# Patient Record
Sex: FEMALE | Race: BLACK OR AFRICAN AMERICAN | Employment: FULL TIME | URBAN - METROPOLITAN AREA
[De-identification: names, ages, dates, MRNs, and addresses within clinical notes are randomized per-mention and may not be internally consistent; named-entity substitution may affect disease eponyms.]

---

## 2024-03-12 PROBLEM — R91.1 LESION OF RIGHT LUNG: Status: ACTIVE | Noted: 2024-03-12

## 2024-03-12 PROBLEM — L98.9 LEG LESION: Status: ACTIVE | Noted: 2024-03-12

## 2024-03-12 PROBLEM — Z72.0 TOBACCO USE: Status: ACTIVE | Noted: 2024-03-12

## 2024-03-12 PROBLEM — Z12.31 BREAST CANCER SCREENING BY MAMMOGRAM: Status: ACTIVE | Noted: 2024-03-12

## 2024-03-25 ENCOUNTER — OFFICE VISIT (OUTPATIENT)
Dept: PULMONOLOGY | Age: 54
End: 2024-03-25
Payer: COMMERCIAL

## 2024-03-25 VITALS
HEIGHT: 62 IN | DIASTOLIC BLOOD PRESSURE: 68 MMHG | WEIGHT: 104 LBS | HEART RATE: 68 BPM | SYSTOLIC BLOOD PRESSURE: 140 MMHG | OXYGEN SATURATION: 98 % | BODY MASS INDEX: 19.14 KG/M2

## 2024-03-25 DIAGNOSIS — E78.49 OTHER HYPERLIPIDEMIA: ICD-10-CM

## 2024-03-25 DIAGNOSIS — R91.1 PULMONARY NODULE: Primary | ICD-10-CM

## 2024-03-25 DIAGNOSIS — I10 PRIMARY HYPERTENSION: ICD-10-CM

## 2024-03-25 PROBLEM — E78.5 HYPERLIPIDEMIA: Status: ACTIVE | Noted: 2024-03-25

## 2024-03-25 PROCEDURE — 3077F SYST BP >= 140 MM HG: CPT | Performed by: INTERNAL MEDICINE

## 2024-03-25 PROCEDURE — 3078F DIAST BP <80 MM HG: CPT | Performed by: INTERNAL MEDICINE

## 2024-03-25 PROCEDURE — 99204 OFFICE O/P NEW MOD 45 MIN: CPT | Performed by: INTERNAL MEDICINE

## 2024-03-25 NOTE — PROGRESS NOTES
[facility] Pulmonary and Critical Care    Outpatient Initial Note    Subjective:   CHIEF COMPLAINT / HPI:     The patient is 53 y.o. female who presents today for a new patient visit for abnormal lung cancer screening CT chest.  She has approximately 40-pack-year history of tobacco.  She complains of some dyspnea on exertion however it is partially improved with Breztri although her compliance is spotty.  She has night sweats that she attributes to menopause but no fevers, chills, hemoptysis, anorexia, or weight loss.    Past Medical History:    Past Medical History:   Diagnosis Date    HTN (hypertension)     Hyperlipidemia        Social History:      Currently smokes 3/4 pack of cigarettes a day. Smoked up to 1 PPD since 1983  Works at [employer].    Family History:  Asthma  Colon cancer  - Dad  Prostate cancer  Lung cancer - Aunt - smoker    Current Medications:  Current Outpatient Medications on File Prior to Visit   Medication Sig Dispense Refill    esomeprazole (NEXIUM) 20 MG delayed release capsule Take 1 capsule by mouth daily as needed      atorvastatin (LIPITOR) 10 MG tablet Take 1 tablet by mouth daily      CLONIDINE HCL PO Take 0.5 mg by mouth in the morning and at bedtime      amLODIPine (NORVASC) 10 MG tablet Take 1 tablet by mouth daily      Budeson-Glycopyrrol-Formoterol (BREZTRI AEROSPHERE) 160-9-4.8 MCG/ACT AERO Inhale into the lungs in the morning and at bedtime      albuterol sulfate HFA (VENTOLIN HFA) 108 (90 Base) MCG/ACT inhaler Inhale 2-4 puffs into the lungs 4 times daily as needed (cough) 18 g 0     No current facility-administered medications on file prior to visit.     REVIEW OF SYSTEMS:    CONSTITUTIONAL: Negative for fevers and chills  HEENT: Negative for oropharyngeal exudate, post nasal drip, sinus pain / pressure, nasal congestion, ear pain  RESPIRATORY:  See HPI  CARDIOVASCULAR: Negative for chest pain, palpitations,

## 2024-03-26 ENCOUNTER — PREP FOR PROCEDURE (OUTPATIENT)
Dept: PULMONOLOGY | Age: 54
End: 2024-03-26

## 2024-03-26 DIAGNOSIS — R91.8 MASS OF UPPER LOBE OF RIGHT LUNG: ICD-10-CM

## 2024-03-27 ENCOUNTER — OFFICE VISIT (OUTPATIENT)
Age: 54
End: 2024-03-27
Payer: COMMERCIAL

## 2024-03-27 DIAGNOSIS — F41.9 ANXIETY: ICD-10-CM

## 2024-03-27 DIAGNOSIS — F17.200 TOBACCO USE DISORDER: Primary | ICD-10-CM

## 2024-03-27 PROCEDURE — 90791 PSYCH DIAGNOSTIC EVALUATION: CPT | Performed by: PSYCHOLOGIST

## 2024-03-27 ASSESSMENT — PROMIS GLOBAL HEALTH SCALE
IN GENERAL, HOW WOULD YOU RATE YOUR MENTAL HEALTH, INCLUDING YOUR MOOD AND YOUR ABILITY TO THINK [ON A SCALE OF 1 (POOR) TO 5 (EXCELLENT)]?: GOOD
IN THE PAST 7 DAYS, HOW WOULD YOU RATE YOUR PAIN ON AVERAGE [ON A SCALE FROM 0 (NO PAIN) TO 10 (WORST IMAGINABLE PAIN)]?: 2
IN GENERAL, HOW WOULD YOU RATE YOUR PHYSICAL HEALTH [ON A SCALE OF 1 (POOR) TO 5 (EXCELLENT)]?: POOR
SUM OF RESPONSES TO QUESTIONS 2, 4, 5, & 10: 8
IN GENERAL, HOW WOULD YOU RATE YOUR SATISFACTION WITH YOUR SOCIAL ACTIVITIES AND RELATIONSHIPS [ON A SCALE OF 1 (POOR) TO 5 (EXCELLENT)]?: POOR
SUM OF RESPONSES TO QUESTIONS 3, 6, 7, & 8: 10
IN THE PAST 7 DAYS, HOW WOULD YOU RATE YOUR FATIGUE ON AVERAGE [ON A SCALE FROM 1 (NONE) TO 5 (VERY SEVERE)]?: MILD
IN GENERAL, PLEASE RATE HOW WELL YOU CARRY OUT YOUR USUAL SOCIAL ACTIVITIES (INCLUDES ACTIVITIES AT HOME, AT WORK, AND IN YOUR COMMUNITY, AND RESPONSIBILITIES AS A PARENT, CHILD, SPOUSE, EMPLOYEE, FRIEND, ETC) [ON A SCALE OF 1 (POOR) TO 5 (EXCELLENT)]?: FAIR
IN GENERAL, WOULD YOU SAY YOUR HEALTH IS...[ON A SCALE OF 1 (POOR) TO 5 (EXCELLENT)]: POOR
IN GENERAL, WOULD YOU SAY YOUR QUALITY OF LIFE IS...[ON A SCALE OF 1 (POOR) TO 5 (EXCELLENT)]: POOR
TO WHAT EXTENT ARE YOU ABLE TO CARRY OUT YOUR EVERYDAY PHYSICAL ACTIVITIES SUCH AS WALKING, CLIMBING STAIRS, CARRYING GROCERIES, OR MOVING A CHAIR [ON A SCALE OF 1 (NOT AT ALL) TO 5 (COMPLETELY)]?: MODERATELY
IN THE PAST 7 DAYS, HOW OFTEN HAVE YOU BEEN BOTHERED BY EMOTIONAL PROBLEMS, SUCH AS FEELING ANXIOUS, DEPRESSED, OR IRRITABLE [ON A SCALE FROM 1 (NEVER) TO 5 (ALWAYS)]?: SOMETIMES

## 2024-03-27 NOTE — PROGRESS NOTES
Behavioral Health Consultation   Molly Childress, PhD  Clinical Psychologist  3/27/2024      Time spent with Patient: 44 minutes  10:31-11:15  This is patient's 1st  [facility] appointment.    Reason for Consult:   Chief Complaint   Patient presents with    Anxiety    Stress       Referring Provider: Olive Jasmine MD    Pt provided informed consent for the behavioral health program. Discussed with patient model of service to include the limits of confidentiality (i.e. abuse reporting, suicide intervention, etc.) and short-term intervention focused approach. Pt indicated understanding.    Subjective  LIFE CONTEXT   1. Family  With whom do you live? Spouse Dann   or partner? Yes Length of relationship? 10  Children?  Yes 2 sons, and 11 grand children  Type of relationships with the people you live with? Poor  Supportive relationships? Self    2. Social  Friends? Yes Quality? Good Frequency of contact? weekly  Other connection with community? no    3. Work/School  Work/go to school? Employed full time [employer]  How many years in this job? 2 How are you doing?  Physical aspects are difficult  How do you support yourself financially? work    4. Recreation  For fun? Relaxation? Television and beer, cigarettes, liquor and marijuana   How often? daily    5. Self-Care  Exercise on a regular basis for health? No  What type of exercise and how often?  Work is very physical     Sleep ok? normal  Eat ok? Fair  Use tobacco (what and how often)?  smokes 3/4 per Daily for forty years  Use caffeine (what and how often)?   1  cups of coffee per day  Use alcohol (what and how often)?  1 22 oz beers per day, 16 oz cup of liquor  about 13 years - has reduced liquor usage  Use drugs (what and how often)? Current drug usage.  Type of drug:  Marijuana.  Frequency of use:  Daily.  Duration of drug use:  20+ years  Problems in the past with tobacco, alcohol or drugs?  Tried [place] 11-12 years ago - they discharged

## 2024-03-30 ENCOUNTER — HOSPITAL ENCOUNTER (OUTPATIENT)
Dept: CT IMAGING | Age: 54
Discharge: HOME | End: 2024-03-30
Attending: INTERNAL MEDICINE
Payer: COMMERCIAL

## 2024-03-30 DIAGNOSIS — R91.1 PULMONARY NODULE: ICD-10-CM

## 2024-03-30 PROCEDURE — 71250 CT THORAX DX C-: CPT

## 2024-04-03 ENCOUNTER — TELEPHONE (OUTPATIENT)
Dept: PULMONOLOGY | Age: 54
End: 2024-04-03

## 2024-04-04 ENCOUNTER — PREP FOR PROCEDURE (OUTPATIENT)
Dept: PULMONOLOGY | Age: 54
End: 2024-04-04

## 2024-04-04 DIAGNOSIS — R91.1 PULMONARY NODULE 1 CM OR GREATER IN DIAMETER: ICD-10-CM

## 2024-04-04 RX ORDER — SODIUM CHLORIDE 0.9 % (FLUSH) 0.9 %
5-40 SYRINGE (ML) INJECTION PRN
Status: CANCELLED | OUTPATIENT
Start: 2024-04-04

## 2024-04-04 RX ORDER — SODIUM CHLORIDE 9 MG/ML
INJECTION, SOLUTION INTRAVENOUS PRN
Status: CANCELLED | OUTPATIENT
Start: 2024-04-04

## 2024-04-04 RX ORDER — SODIUM CHLORIDE 0.9 % (FLUSH) 0.9 %
5-40 SYRINGE (ML) INJECTION EVERY 12 HOURS SCHEDULED
Status: CANCELLED | OUTPATIENT
Start: 2024-04-04

## 2024-04-04 RX ORDER — SODIUM CHLORIDE, SODIUM LACTATE, POTASSIUM CHLORIDE, CALCIUM CHLORIDE 600; 310; 30; 20 MG/100ML; MG/100ML; MG/100ML; MG/100ML
INJECTION, SOLUTION INTRAVENOUS CONTINUOUS
Status: CANCELLED | OUTPATIENT
Start: 2024-04-04

## 2024-04-04 NOTE — TELEPHONE ENCOUNTER
Lisa called, I spoke to her about needing to change to time/ date and location of her planned procedure.    She agreed to changing location of Bronch to [facility] for 4/19 8AM

## 2024-04-08 ENCOUNTER — TELEPHONE (OUTPATIENT)
Dept: PULMONOLOGY | Age: 54
End: 2024-04-08

## 2024-04-08 NOTE — TELEPHONE ENCOUNTER
Pt called to find out when her bronch is. Pt was informed that it is scheduled for 4.19.24 at 8:00am at [facility]. Pt was instructed to arrive by 7:00am and to not use blood thinners and NPO after midnight.

## 2024-04-08 NOTE — TELEPHONE ENCOUNTER
Lisa called to confirm bronchoscopy procedure being done at [facility] on Friday, April 19, 2024.  She is to arrive at 7 AM at Main Entrance at [facility] for a 8 AM procedure. Nothing to eat or drink after midnight the night prior except for blood pressure med to be taken with small sip of water.  She will have son drive her home and  will stay with her.   She expressed understanding ot the instructions listed above.    [facility].

## 2024-04-08 NOTE — TELEPHONE ENCOUNTER
Tuyet (office mgr at [facility] - Dr. Estevez office) called to make sure that Dr. Hernadez was aware that Dr. Connor wanted him to do a ion robotic bronch on his patient.  I see that it has been scheduled for 4/16/24, but there is no documentation that the physicians have spoken about this.    Please verify that you are aware of the upcoming procedure.    We need to call Tuyet or Chasity back at [phone] to inform her of Dr. Hernadez's response.

## 2024-04-16 ENCOUNTER — OFFICE VISIT (OUTPATIENT)
Age: 54
End: 2024-04-16
Payer: COMMERCIAL

## 2024-04-16 DIAGNOSIS — F41.9 ANXIETY: ICD-10-CM

## 2024-04-16 DIAGNOSIS — F17.200 TOBACCO USE DISORDER: Primary | ICD-10-CM

## 2024-04-16 PROCEDURE — 90832 PSYTX W PT 30 MINUTES: CPT | Performed by: PSYCHOLOGIST

## 2024-04-16 ASSESSMENT — PROMIS GLOBAL HEALTH SCALE
TO WHAT EXTENT ARE YOU ABLE TO CARRY OUT YOUR EVERYDAY PHYSICAL ACTIVITIES SUCH AS WALKING, CLIMBING STAIRS, CARRYING GROCERIES, OR MOVING A CHAIR [ON A SCALE OF 1 (NOT AT ALL) TO 5 (COMPLETELY)]?: MOSTLY
IN GENERAL, HOW WOULD YOU RATE YOUR SATISFACTION WITH YOUR SOCIAL ACTIVITIES AND RELATIONSHIPS [ON A SCALE OF 1 (POOR) TO 5 (EXCELLENT)]?: FAIR
SUM OF RESPONSES TO QUESTIONS 3, 6, 7, & 8: 15
IN GENERAL, PLEASE RATE HOW WELL YOU CARRY OUT YOUR USUAL SOCIAL ACTIVITIES (INCLUDES ACTIVITIES AT HOME, AT WORK, AND IN YOUR COMMUNITY, AND RESPONSIBILITIES AS A PARENT, CHILD, SPOUSE, EMPLOYEE, FRIEND, ETC) [ON A SCALE OF 1 (POOR) TO 5 (EXCELLENT)]?: GOOD
IN GENERAL, HOW WOULD YOU RATE YOUR PHYSICAL HEALTH [ON A SCALE OF 1 (POOR) TO 5 (EXCELLENT)]?: FAIR
IN THE PAST 7 DAYS, HOW WOULD YOU RATE YOUR PAIN ON AVERAGE [ON A SCALE FROM 0 (NO PAIN) TO 10 (WORST IMAGINABLE PAIN)]?: 5
IN GENERAL, WOULD YOU SAY YOUR HEALTH IS...[ON A SCALE OF 1 (POOR) TO 5 (EXCELLENT)]: FAIR
IN GENERAL, HOW WOULD YOU RATE YOUR MENTAL HEALTH, INCLUDING YOUR MOOD AND YOUR ABILITY TO THINK [ON A SCALE OF 1 (POOR) TO 5 (EXCELLENT)]?: FAIR
IN THE PAST 7 DAYS, HOW WOULD YOU RATE YOUR FATIGUE ON AVERAGE [ON A SCALE FROM 1 (NONE) TO 5 (VERY SEVERE)]?: MILD
SUM OF RESPONSES TO QUESTIONS 2, 4, 5, & 10: 8
IN THE PAST 7 DAYS, HOW OFTEN HAVE YOU BEEN BOTHERED BY EMOTIONAL PROBLEMS, SUCH AS FEELING ANXIOUS, DEPRESSED, OR IRRITABLE [ON A SCALE FROM 1 (NEVER) TO 5 (ALWAYS)]?: OFTEN
IN GENERAL, WOULD YOU SAY YOUR QUALITY OF LIFE IS...[ON A SCALE OF 1 (POOR) TO 5 (EXCELLENT)]: FAIR

## 2024-04-16 NOTE — PROGRESS NOTES
euthymic      4/16/24 PROMIS-10 Scores: Physical: 15 Mental: 8    PROMIS Raw Score   WNL Symptoms /Impairment     Mild Moderate Severe   Global Physical Health 15-20 13-14 9-12 4-8   Global Mental Health 14-20 11-13 7-10 4-6     Assessment and Plan  Anxiety, tobacco use disorder. Pt  reports improvement in functioning, she is journaling daily and reducing cigarette intake, she is reducing by 2 cigarettes this week. Marital stressors persist. She is beginning to notice sources of stress and how they affect functioning. Anxiety is heightened with upcoming biopsy, she is managing this effectively with self talk, prayer and use of breathing.  Visit spent discussing recent events and gains made in functioning, patient is motivated to continue to reduce cigarette usage as well as better manage ongoing stressors    Strengths: Patient is motivated to improve functioning      Recommendations to Patient:   1.  Add stress scales into daily journaling: Scales 0-5 0 no stress, 5 is max stress - smoking, anxiety, work, Dann   2. Continue to reduce cigarettes by one per week    Recommendations to PCP:   1. Reinforce recommendations to pt.    Follow-up plan: To see in 2 weeks    Referrals: None    Molly Childress, PhD     This note was generated completely or in part utilizing Dragon dictation speech recognition software.  Occasionally, words are mistranscribed and despite editing, the text may contain inaccuracies due to incorrect word recognition.  If further clarification is needed please contact the office at [phone]

## 2024-04-19 ENCOUNTER — HOSPITAL ENCOUNTER (OUTPATIENT)
Age: 54
Setting detail: OUTPATIENT SURGERY
Discharge: HOME | End: 2024-04-19
Attending: STUDENT IN AN ORGANIZED HEALTH CARE EDUCATION/TRAINING PROGRAM | Admitting: STUDENT IN AN ORGANIZED HEALTH CARE EDUCATION/TRAINING PROGRAM
Payer: COMMERCIAL

## 2024-04-19 ENCOUNTER — APPOINTMENT (OUTPATIENT)
Dept: GENERAL RADIOLOGY | Age: 54
End: 2024-04-19
Attending: STUDENT IN AN ORGANIZED HEALTH CARE EDUCATION/TRAINING PROGRAM
Payer: COMMERCIAL

## 2024-04-19 ENCOUNTER — ANESTHESIA EVENT (OUTPATIENT)
Dept: ENDOSCOPY | Age: 54
End: 2024-04-19
Payer: COMMERCIAL

## 2024-04-19 ENCOUNTER — ANESTHESIA (OUTPATIENT)
Dept: ENDOSCOPY | Age: 54
End: 2024-04-19
Payer: COMMERCIAL

## 2024-04-19 VITALS
HEART RATE: 57 BPM | RESPIRATION RATE: 16 BRPM | DIASTOLIC BLOOD PRESSURE: 71 MMHG | SYSTOLIC BLOOD PRESSURE: 124 MMHG | OXYGEN SATURATION: 95 % | BODY MASS INDEX: 19.51 KG/M2 | TEMPERATURE: 98.1 F | WEIGHT: 106 LBS | HEIGHT: 62 IN

## 2024-04-19 DIAGNOSIS — Z98.890 HISTORY OF BRONCHOSCOPY: ICD-10-CM

## 2024-04-19 PROCEDURE — 3609020000 HC BRONCHOSCOPY W/EBUS FNA: Performed by: STUDENT IN AN ORGANIZED HEALTH CARE EDUCATION/TRAINING PROGRAM

## 2024-04-19 PROCEDURE — C1725 CATH, TRANSLUMIN NON-LASER: HCPCS | Performed by: STUDENT IN AN ORGANIZED HEALTH CARE EDUCATION/TRAINING PROGRAM

## 2024-04-19 PROCEDURE — 88342 IMHCHEM/IMCYTCHM 1ST ANTB: CPT

## 2024-04-19 PROCEDURE — 3609011100 HC BRONCHOSCOPY BRUSHINGS: Performed by: STUDENT IN AN ORGANIZED HEALTH CARE EDUCATION/TRAINING PROGRAM

## 2024-04-19 PROCEDURE — 6360000002 HC RX W HCPCS

## 2024-04-19 PROCEDURE — 2580000003 HC RX 258: Performed by: ANESTHESIOLOGY

## 2024-04-19 PROCEDURE — 7100000010 HC PHASE II RECOVERY - FIRST 15 MIN: Performed by: STUDENT IN AN ORGANIZED HEALTH CARE EDUCATION/TRAINING PROGRAM

## 2024-04-19 PROCEDURE — 31623 DX BRONCHOSCOPE/BRUSH: CPT | Performed by: STUDENT IN AN ORGANIZED HEALTH CARE EDUCATION/TRAINING PROGRAM

## 2024-04-19 PROCEDURE — 31645 BRNCHSC W/THER ASPIR 1ST: CPT | Performed by: STUDENT IN AN ORGANIZED HEALTH CARE EDUCATION/TRAINING PROGRAM

## 2024-04-19 PROCEDURE — 88173 CYTOPATH EVAL FNA REPORT: CPT

## 2024-04-19 PROCEDURE — 71046 X-RAY EXAM CHEST 2 VIEWS: CPT

## 2024-04-19 PROCEDURE — 31628 BRONCHOSCOPY/LUNG BX EACH: CPT | Performed by: STUDENT IN AN ORGANIZED HEALTH CARE EDUCATION/TRAINING PROGRAM

## 2024-04-19 PROCEDURE — 2709999900 HC NON-CHARGEABLE SUPPLY: Performed by: STUDENT IN AN ORGANIZED HEALTH CARE EDUCATION/TRAINING PROGRAM

## 2024-04-19 PROCEDURE — 3609010800 HC BRONCHOSCOPY ALVEOLAR LAVAGE: Performed by: STUDENT IN AN ORGANIZED HEALTH CARE EDUCATION/TRAINING PROGRAM

## 2024-04-19 PROCEDURE — 7100000001 HC PACU RECOVERY - ADDTL 15 MIN: Performed by: STUDENT IN AN ORGANIZED HEALTH CARE EDUCATION/TRAINING PROGRAM

## 2024-04-19 PROCEDURE — 71045 X-RAY EXAM CHEST 1 VIEW: CPT

## 2024-04-19 PROCEDURE — 2720000010 HC SURG SUPPLY STERILE: Performed by: STUDENT IN AN ORGANIZED HEALTH CARE EDUCATION/TRAINING PROGRAM

## 2024-04-19 PROCEDURE — 31654 BRONCH EBUS IVNTJ PERPH LES: CPT | Performed by: STUDENT IN AN ORGANIZED HEALTH CARE EDUCATION/TRAINING PROGRAM

## 2024-04-19 PROCEDURE — 7100000000 HC PACU RECOVERY - FIRST 15 MIN: Performed by: STUDENT IN AN ORGANIZED HEALTH CARE EDUCATION/TRAINING PROGRAM

## 2024-04-19 PROCEDURE — 88177 CYTP FNA EVAL EA ADDL: CPT

## 2024-04-19 PROCEDURE — 31629 BRONCHOSCOPY/NEEDLE BX EACH: CPT | Performed by: STUDENT IN AN ORGANIZED HEALTH CARE EDUCATION/TRAINING PROGRAM

## 2024-04-19 PROCEDURE — 88305 TISSUE EXAM BY PATHOLOGIST: CPT

## 2024-04-19 PROCEDURE — 3700000001 HC ADD 15 MINUTES (ANESTHESIA): Performed by: STUDENT IN AN ORGANIZED HEALTH CARE EDUCATION/TRAINING PROGRAM

## 2024-04-19 PROCEDURE — 7100000011 HC PHASE II RECOVERY - ADDTL 15 MIN: Performed by: STUDENT IN AN ORGANIZED HEALTH CARE EDUCATION/TRAINING PROGRAM

## 2024-04-19 PROCEDURE — 88104 CYTOPATH FL NONGYN SMEARS: CPT

## 2024-04-19 PROCEDURE — 3700000000 HC ANESTHESIA ATTENDED CARE: Performed by: STUDENT IN AN ORGANIZED HEALTH CARE EDUCATION/TRAINING PROGRAM

## 2024-04-19 PROCEDURE — 2500000003 HC RX 250 WO HCPCS

## 2024-04-19 PROCEDURE — 88341 IMHCHEM/IMCYTCHM EA ADD ANTB: CPT

## 2024-04-19 PROCEDURE — 88112 CYTOPATH CELL ENHANCE TECH: CPT

## 2024-04-19 PROCEDURE — 31624 DX BRONCHOSCOPE/LAVAGE: CPT | Performed by: STUDENT IN AN ORGANIZED HEALTH CARE EDUCATION/TRAINING PROGRAM

## 2024-04-19 PROCEDURE — 31627 NAVIGATIONAL BRONCHOSCOPY: CPT | Performed by: STUDENT IN AN ORGANIZED HEALTH CARE EDUCATION/TRAINING PROGRAM

## 2024-04-19 PROCEDURE — 3609011800 HC BRONCHOSCOPY/TRANSBRONCHIAL LUNG BIOPSY: Performed by: STUDENT IN AN ORGANIZED HEALTH CARE EDUCATION/TRAINING PROGRAM

## 2024-04-19 PROCEDURE — 88172 CYTP DX EVAL FNA 1ST EA SITE: CPT

## 2024-04-19 RX ORDER — PROPOFOL 10 MG/ML
INJECTION, EMULSION INTRAVENOUS CONTINUOUS PRN
Status: DISCONTINUED | OUTPATIENT
Start: 2024-04-19 | End: 2024-04-19 | Stop reason: SDUPTHER

## 2024-04-19 RX ORDER — LIDOCAINE HYDROCHLORIDE 20 MG/ML
INJECTION, SOLUTION EPIDURAL; INFILTRATION; INTRACAUDAL; PERINEURAL PRN
Status: DISCONTINUED | OUTPATIENT
Start: 2024-04-19 | End: 2024-04-19 | Stop reason: SDUPTHER

## 2024-04-19 RX ORDER — SODIUM CHLORIDE 0.9 % (FLUSH) 0.9 %
5-40 SYRINGE (ML) INJECTION EVERY 12 HOURS SCHEDULED
Status: DISCONTINUED | OUTPATIENT
Start: 2024-04-19 | End: 2024-04-19 | Stop reason: HOSPADM

## 2024-04-19 RX ORDER — SODIUM CHLORIDE 0.9 % (FLUSH) 0.9 %
5-40 SYRINGE (ML) INJECTION PRN
Status: DISCONTINUED | OUTPATIENT
Start: 2024-04-19 | End: 2024-04-19 | Stop reason: HOSPADM

## 2024-04-19 RX ORDER — PHENYLEPHRINE HCL IN 0.9% NACL 1 MG/10 ML
SYRINGE (ML) INTRAVENOUS PRN
Status: DISCONTINUED | OUTPATIENT
Start: 2024-04-19 | End: 2024-04-19 | Stop reason: SDUPTHER

## 2024-04-19 RX ORDER — ROCURONIUM BROMIDE 10 MG/ML
INJECTION, SOLUTION INTRAVENOUS PRN
Status: DISCONTINUED | OUTPATIENT
Start: 2024-04-19 | End: 2024-04-19 | Stop reason: SDUPTHER

## 2024-04-19 RX ORDER — SODIUM CHLORIDE, SODIUM LACTATE, POTASSIUM CHLORIDE, CALCIUM CHLORIDE 600; 310; 30; 20 MG/100ML; MG/100ML; MG/100ML; MG/100ML
INJECTION, SOLUTION INTRAVENOUS CONTINUOUS
Status: DISCONTINUED | OUTPATIENT
Start: 2024-04-19 | End: 2024-04-19 | Stop reason: HOSPADM

## 2024-04-19 RX ORDER — SODIUM CHLORIDE 9 MG/ML
INJECTION, SOLUTION INTRAVENOUS PRN
Status: DISCONTINUED | OUTPATIENT
Start: 2024-04-19 | End: 2024-04-19 | Stop reason: HOSPADM

## 2024-04-19 RX ORDER — ONDANSETRON 2 MG/ML
4 INJECTION INTRAMUSCULAR; INTRAVENOUS
Status: DISCONTINUED | OUTPATIENT
Start: 2024-04-19 | End: 2024-04-19 | Stop reason: HOSPADM

## 2024-04-19 RX ORDER — ONDANSETRON 2 MG/ML
INJECTION INTRAMUSCULAR; INTRAVENOUS PRN
Status: DISCONTINUED | OUTPATIENT
Start: 2024-04-19 | End: 2024-04-19 | Stop reason: SDUPTHER

## 2024-04-19 RX ORDER — DEXAMETHASONE SODIUM PHOSPHATE 4 MG/ML
INJECTION, SOLUTION INTRA-ARTICULAR; INTRALESIONAL; INTRAMUSCULAR; INTRAVENOUS; SOFT TISSUE PRN
Status: DISCONTINUED | OUTPATIENT
Start: 2024-04-19 | End: 2024-04-19 | Stop reason: SDUPTHER

## 2024-04-19 RX ORDER — MEPERIDINE HYDROCHLORIDE 50 MG/ML
12.5 INJECTION INTRAMUSCULAR; INTRAVENOUS; SUBCUTANEOUS EVERY 5 MIN PRN
Status: DISCONTINUED | OUTPATIENT
Start: 2024-04-19 | End: 2024-04-19 | Stop reason: HOSPADM

## 2024-04-19 RX ORDER — NALOXONE HYDROCHLORIDE 0.4 MG/ML
INJECTION, SOLUTION INTRAMUSCULAR; INTRAVENOUS; SUBCUTANEOUS PRN
Status: DISCONTINUED | OUTPATIENT
Start: 2024-04-19 | End: 2024-04-19 | Stop reason: HOSPADM

## 2024-04-19 RX ORDER — MIDAZOLAM HYDROCHLORIDE 1 MG/ML
2 INJECTION INTRAMUSCULAR; INTRAVENOUS
Status: DISCONTINUED | OUTPATIENT
Start: 2024-04-19 | End: 2024-04-19 | Stop reason: HOSPADM

## 2024-04-19 RX ORDER — DIPHENHYDRAMINE HYDROCHLORIDE 50 MG/ML
12.5 INJECTION INTRAMUSCULAR; INTRAVENOUS
Status: DISCONTINUED | OUTPATIENT
Start: 2024-04-19 | End: 2024-04-19 | Stop reason: HOSPADM

## 2024-04-19 RX ORDER — LABETALOL HYDROCHLORIDE 5 MG/ML
10 INJECTION, SOLUTION INTRAVENOUS
Status: DISCONTINUED | OUTPATIENT
Start: 2024-04-19 | End: 2024-04-19 | Stop reason: HOSPADM

## 2024-04-19 RX ORDER — FENTANYL CITRATE 50 UG/ML
INJECTION, SOLUTION INTRAMUSCULAR; INTRAVENOUS PRN
Status: DISCONTINUED | OUTPATIENT
Start: 2024-04-19 | End: 2024-04-19 | Stop reason: SDUPTHER

## 2024-04-19 RX ORDER — OXYCODONE HYDROCHLORIDE 5 MG/1
5 TABLET ORAL
Status: DISCONTINUED | OUTPATIENT
Start: 2024-04-19 | End: 2024-04-19 | Stop reason: HOSPADM

## 2024-04-19 RX ORDER — PROPOFOL 10 MG/ML
INJECTION, EMULSION INTRAVENOUS PRN
Status: DISCONTINUED | OUTPATIENT
Start: 2024-04-19 | End: 2024-04-19 | Stop reason: SDUPTHER

## 2024-04-19 RX ADMIN — Medication 100 MCG: at 08:54

## 2024-04-19 RX ADMIN — ONDANSETRON 4 MG: 2 INJECTION INTRAMUSCULAR; INTRAVENOUS at 08:29

## 2024-04-19 RX ADMIN — ROCURONIUM BROMIDE 50 MG: 50 INJECTION, SOLUTION INTRAVENOUS at 08:08

## 2024-04-19 RX ADMIN — PROPOFOL 50 MG: 10 INJECTION, EMULSION INTRAVENOUS at 08:18

## 2024-04-19 RX ADMIN — PROPOFOL 100 MG: 10 INJECTION, EMULSION INTRAVENOUS at 08:07

## 2024-04-19 RX ADMIN — LIDOCAINE HYDROCHLORIDE 60 MG: 20 INJECTION, SOLUTION EPIDURAL; INFILTRATION; INTRACAUDAL; PERINEURAL at 08:07

## 2024-04-19 RX ADMIN — DEXAMETHASONE SODIUM PHOSPHATE 10 MG: 4 INJECTION, SOLUTION INTRAMUSCULAR; INTRAVENOUS at 08:29

## 2024-04-19 RX ADMIN — PROPOFOL 50 MCG/KG/MIN: 10 INJECTION, EMULSION INTRAVENOUS at 08:18

## 2024-04-19 RX ADMIN — FENTANYL CITRATE 50 MCG: 50 INJECTION, SOLUTION INTRAMUSCULAR; INTRAVENOUS at 08:07

## 2024-04-19 RX ADMIN — SODIUM CHLORIDE, SODIUM LACTATE, POTASSIUM CHLORIDE, AND CALCIUM CHLORIDE: .6; .31; .03; .02 INJECTION, SOLUTION INTRAVENOUS at 08:05

## 2024-04-19 RX ADMIN — ROCURONIUM BROMIDE 15 MG: 50 INJECTION, SOLUTION INTRAVENOUS at 09:03

## 2024-04-19 ASSESSMENT — ENCOUNTER SYMPTOMS
COLOR CHANGE: 0
SHORTNESS OF BREATH: 0
SORE THROAT: 0
VOMITING: 0
EYE DISCHARGE: 0
CONSTIPATION: 0
WHEEZING: 0
COUGH: 0
TROUBLE SWALLOWING: 0
EYE REDNESS: 0
EYE PAIN: 0
EYE ITCHING: 0
NAUSEA: 0
STRIDOR: 0
ABDOMINAL DISTENTION: 0
ABDOMINAL PAIN: 0
BACK PAIN: 0
DIARRHEA: 0

## 2024-04-19 ASSESSMENT — PAIN SCALES - GENERAL
PAINLEVEL_OUTOF10: 0

## 2024-04-19 ASSESSMENT — PAIN - FUNCTIONAL ASSESSMENT: PAIN_FUNCTIONAL_ASSESSMENT: 0-10

## 2024-04-19 NOTE — ANESTHESIA PRE PROCEDURE
[facility] IV soln infusion   IntraVENous Continuous Elvin Hernadez MD           Allergies:  No Known Allergies    Problem List:    Patient Active Problem List   Diagnosis Code    Breast cancer screening by mammogram Z12.31    Lesion of right lung R91.1    Leg mass, left R22.42    Elevated bilirubin R17    Tobacco use Z72.0    HTN (hypertension) I10    Hyperlipidemia E78.5    Mass of upper lobe of right lung R91.8    Pulmonary nodule 1 cm or greater in diameter R91.1       Past Medical History:        Diagnosis Date    HTN (hypertension)     Hyperlipidemia     Mass of right lung        Past Surgical History:        Procedure Laterality Date    BREAST BIOPSY Right     BUNIONECTOMY Left     LAPAROSCOPY      NECK MASS EXCISION      throat    TUBAL LIGATION         Social History:    Social History     Tobacco Use    Smoking status: Every Day     Current packs/day: 1.00     Average packs/day: 1 pack/day for 40.3 years (40.3 ttl pk-yrs)     Types: Cigarettes     Start date: 1984    Smokeless tobacco: Never   Substance Use Topics    Alcohol use: Not Currently     Alcohol/week: 35.0 standard drinks of alcohol     Types: 7 Cans of beer, 28 Drinks containing 0.5 oz of alcohol per week                                Ready to quit: Not Answered  Counseling given: Not Answered      Vital Signs (Current):   Vitals:    04/16/24 0933 04/19/24 0744   BP:  130/78   Pulse:  64   Resp:  16   Temp:  97.9 °F (36.6 °C)   TempSrc:  Oral   SpO2:  100%   Weight: 48.1 kg (106 lb)    Height: 1.575 m (5' 2\")                                               BP Readings from Last 3 Encounters:   04/19/24 130/78   03/25/24 (!) 140/68   03/12/24 138/80       NPO Status: Time of last liquid consumption: 2200                        Time of last solid consumption: 2200                        Date of last liquid consumption: 04/18/24                        Date of last solid food consumption: 04/18/24    BMI:   Wt Readings from Last 3 Encounters:

## 2024-04-19 NOTE — PROGRESS NOTES
Awake and alert with no complaints. Respirations easy. Coughed a small clump of bright red blood. VS stable. Meets PACU phase 1 discharge criteria.   
Awake and alert with no complaints. VS stable. Discharge instructions reviewed with patient/responsible adult and understanding verbalized. Discharge instructions copies given. Discharge criteria met per hospital policy. Patient discharged home with belongings.   
Awake and alert. No longer crying. Dr Hernadez here to talk to patient and family.   
Chest x-ray done.  
Lisa Hossein    Age 53 y.o.    female    1970    N [id number]    4/19/2024  Arrival Time_____________  OR Time____________120 Min     Procedure(s):  ION ROBOTIC BRONCHOSCOPY                      General    Surgeon(s):  Maricarmen, Elvin, MD       Phone [phone] (home)     Initals  Date  Info Source  Home  Cell         Work  _____________________________________________________________________  _____________________________________________________________________  _____________________________________________________________________  _____________________________________________________________________  _____________________________________________________________________    PCP _____________________________ Phone_________________     H&P  ________________  Bringing      Chart              Epic      DOS      Called________  EKG ________________   Bringing      Chart              Epic      DOS      Called________  LABS________________   Bringing     Chart              Epic      DOS      Called________  Cardiac Clearance ______ Bringing      Chart              Epic      DOS      Called________  Pulmonary Clearance____ Bringing      Chart              Epic      DOS      Called________    Cardiologist________________________ Phone___________________________  Pulmonologist_______________________Phone___________________________    ? Advance Directives   ? Roman [employer] concerns / Waiver on Chart            PAT Communications________________  ? Pre-op Instructions Given /Understood          _________________________________  ? Directions to Surgery Center                          _________________________________  ? Transportation Home_______________      __________________________________  ? Crutches/Walker__________________        __________________________________    Orders: Hard copy/ EPIC                 Transcribed/ EPIC              _______Wt.    ________Pharmacy                         _______SCD             
Received in PACU. Report received from endo nurse and CRNA. Patient awake and crying a little. Denies discomfort. Family brought tto bedside.   
Teaching / education initiated regarding perioperative experience, expectations, and pain management during stay. Patient verbalized understanding.    
cold, fever, sore throat, nausea, vomiting, etc.  Please notify your surgeon if you experience dizziness, shortness of breath or blurred vision between now & the time of your surgery  To provide excellent care visitors will be limited to two per room at any given time. No visitors under the age of 14.  If you use oxygen and have a portable tank please bring it with you the DOS  For your convenience [facility] has a pharmacy on site to fill your prescriptions.     *Please call pre admission testing if you have any further questions             [place]      [phone]                            Address: [address] [place] [address]     When you pull into the hospital and are looking at the main hospital entrance, turn right.   We are a tan building to the right of the main entrance.   [id number] over the door.  .                                                        Revision History

## 2024-04-19 NOTE — H&P
HISTORY AND PHYSICAL             Date: 4/19/2024        Patient Name: Lisa Catalan     YOB: 1970      Age:  53 y.o.    Chief Complaint      ”Lung nodule”    History Obtained From   patient    History of Present Illness     The patient is 53 y.o. female who presented to [facility] with abnormal imaging. She was seen outpatient by Dr. Connor for lung nodule. She presents to [facility] for Robotic bronchoscopy. She has approximately 40-pack-year history of tobacco.  She complains of some dyspnea on exertion however it is partially improved with Breztri although her compliance is spotty.  She has night sweats that she attributes to menopause but no fevers, chills, hemoptysis, anorexia, or weight loss.     Pt has no additional complaints today. She is NPO for procedure.     Past Medical History     Past Medical History:   Diagnosis Date    HTN (hypertension)     Hyperlipidemia     Mass of right lung         Past Surgical History     Past Surgical History:   Procedure Laterality Date    BREAST BIOPSY Right     BUNIONECTOMY Left     LAPAROSCOPY      NECK MASS EXCISION      throat    TUBAL LIGATION          Medications Prior to Admission     Prior to Admission medications    Medication Sig Start Date End Date Taking? Authorizing Provider   LIDOCAINE EX Apply topically as needed   Yes Ronny Orellana MD   esomeprazole (NEXIUM) 20 MG delayed release capsule Take 1 capsule by mouth daily as needed    Ronny Orellana MD   atorvastatin (LIPITOR) 10 MG tablet Take 1 tablet by mouth daily    Ronny Orellana MD   CLONIDINE HCL PO Take 0.5 mg by mouth in the morning and at bedtime    Ronny Orellana MD   amLODIPine (NORVASC) 10 MG tablet Take 1 tablet by mouth daily    Ronny Orellana MD   Budeson-Glycopyrrol-Formoterol (BREZTRI AEROSPHERE) 160-9-4.8 MCG/ACT AERO Inhale into the lungs in the morning and at bedtime    Ronny Orellana MD        Allergies   Patient has no known  (5' 2\")   Wt 48.1 kg (106 lb)   BMI 19.39 kg/m²     Physical Exam  Constitutional:       General: She is not in acute distress.     Appearance: She is not toxic-appearing.   HENT:      Head: Normocephalic and atraumatic.      Nose: Nose normal.      Mouth/Throat:      Pharynx: No oropharyngeal exudate.   Eyes:      General: No scleral icterus.        Right eye: No discharge.         Left eye: No discharge.   Cardiovascular:      Rate and Rhythm: Normal rate and regular rhythm.      Heart sounds: No murmur heard.     No friction rub. No gallop.   Pulmonary:      Effort: Pulmonary effort is normal. No respiratory distress.      Breath sounds: No wheezing or rales.   Abdominal:      General: Abdomen is flat. Bowel sounds are normal.      Palpations: Abdomen is soft.   Musculoskeletal:         General: Normal range of motion.      Cervical back: Normal range of motion.   Skin:     General: Skin is warm and dry.   Neurological:      General: No focal deficit present.      Mental Status: She is alert and oriented to person, place, and time.   Psychiatric:         Mood and Affect: Mood normal.         Labs    No results found for this or any previous visit (from the past 24 hour(s)).     Imaging/Diagnostics Last 24 Hours   No results found.    Assessment      Hospital Problems             Last Modified POA    * (Principal) Pulmonary nodule 1 cm or greater in diameter 4/4/2024 Unknown   Tobacco Dependence    Plan   NPO for Robotic bronchoscopy with therapeutic bronch and possible EBUS biopsies  Consent obtained, the risks and benefits of the procedure were discussed  Post-CXR ordered for procedure  Myself or Dr. Connor will reach out to patient with final results  Advised patient to return to ER immediately if having worsening SOB, hemoptysis, chest pains.    Consultations Ordered:  None    Electronically signed by Elvin Hernadez MD on 4/19/24 at 7:38 AM EDT

## 2024-04-19 NOTE — PROCEDURES
PROCEDURE: Fiberoptic bronchoscopy with robotic bronchoscopy-guided transbronchial needle aspiration, transbronchial brushings, transbronchial biopsies and BAL & endobronchial ultrasound-guided biopsy of lymph nodes.    DESCRIPTION OF PROCEDURE: Informed consent was obtained from the patient after explaining the risks and benefits. A time out was taken. Anesthesia was kindly provided by the anesthesia team.     The bronchoscope was passed with ease via size 8.5 ET tube. All airways were well visualized.  There was no endobronchial lesion.  There were thin secretions in the airways.  All airway secretions were removed by suctioning prior to robotic assisted portion of procedure.    The ION robotic bronchoscope was next inserted into the trachea using standard procedure.  The robot was guided along the designed pathway from pre-procedural CT.  Once the target lesion in the RUL was reached, the presence of the tissue to be biopsied was confirmed using radial probe EBUS.  A concentric lesion was visualized with radial probe EBUS.  While utilizing live fluoroscopic imaging, the following samples were obtained from the target lesion:  Transbronchial needle aspiration    Transbronchial brushings  Transbronchial biopsies  Mini BAL    The patient tolerated the procedure well. Estimated blood loss was less than 5 ml.   Recovery will be per the anesthesia team.      REGI (Rapid On-Site Examination) was performed by pathologist.    FOLLOW UP: I will call the patient with final results from robotic bronchoscopy.  Patient is instructed to call with concerns and if follow up has not already been scheduled.  In the event of severe symptoms or if the patient is unable to reach my office, instructions are given to proceed to the emergency department.     Elvin Hernadez MD  [facility] Pulmonary Critical Care

## 2024-04-19 NOTE — ANESTHESIA POSTPROCEDURE EVALUATION
Department of Anesthesiology  Postprocedure Note    Patient: Lisa Catalan  MRN: [id number]  YOB: 1970  Date of evaluation: 4/19/2024    Procedure Summary       Date: 04/19/24 Room / Location: [place] [address] / [facility]    Anesthesia Start: 0801 Anesthesia Stop: 0939    Procedures:       BRONCHOSCOPY ALVEOLAR LAVAGE ROBOTIC (Right)      BRONCHOSCOPY ENDOBRONCHIAL ULTRASOUND FINE NEEDLE ASPIRATION ROBOTIC      BRONCHOSCOPY BRUSHINGS ROBOTIC      BRONCHOSCOPY/TRANSBRONCHIAL LUNG BIOPSY ROBOTIC Diagnosis:       Pulmonary nodule 1 cm or greater in diameter      (Pulmonary nodule 1 cm or greater in diameter [R91.1])    Surgeons: Elvin Hernadez MD Responsible Provider: Saul Alfonso MD    Anesthesia Type: general ASA Status: 2            Anesthesia Type: No value filed.    Eren Phase I: Eren Score: 10    Eren Phase II: Eren Score: 10    Anesthesia Post Evaluation    Comments: Anes Post-op Note    Name:    Lisa Catalan  MRN:      [id number]    Patient Vitals in the past 12 hrs:  04/19/24 1017, BP:124/71, Temp:98.1 °F (36.7 °C), Temp src:Oral, Pulse:57, Resp:16, SpO2:95 %  04/19/24 1003, BP:127/81, Pulse:57, Resp:16, SpO2:97 %  04/19/24 0950, BP:124/81, Pulse:58, Resp:16, SpO2:100 %  04/19/24 0935, BP:125/89, Temp:97.9 °F (36.6 °C), Temp src:Oral, Pulse:63, Resp:16, SpO2:97 %  04/19/24 0744, BP:130/78, Temp:97.9 °F (36.6 °C), Temp src:Oral, Pulse:64, Resp:16, SpO2:100 %     LABS:    CBC  Lab Results       Component                Value               Date/Time                  WBC                      3.8 (L)             12/04/2022 01:45 PM        HGB                      14.2                12/04/2022 01:45 PM        HCT                      41.1                12/04/2022 01:45 PM        PLT                      247                 12/04/2022 01:45 PM   RENAL  Lab Results       Component                Value               Date/Time                  NA

## 2024-04-19 NOTE — DISCHARGE INSTRUCTIONS
PATIENT INSTRUCTIONS  POST-SEDATION        IMMEDIATELY FOLLOWING PROCEDURE:     1) Do not drive or operate machinery for the first twenty four hours after surgery.      2) Do not make any important decisions for twenty four hours after surgery or while taking narcotic pain medications or sedatives.       3) You should NOT BE LEFT UNATTENDED OR ALONE. A responsible adult should be with you for the rest of the day of your procedure and also during the night for your protection and safety.     4) You may experience some light headedness. Rest at home with activity as tolerated. You may not need to go to bed, but it is important to rest for the next 24 hours. You should not engage in athletic sports such as basketball, volleyball, jogging, skating, or activities requiring refined motor skills for 24 hours.     5) If you develop intractable nausea and vomiting or a severe headache please notify your doctor immediately.     6) You are not expected to have any fever, but if you feel warm, take your temperature. If you have a fever 101 degrees or higher, call your doctor.      ** Eat lightly for your first meal and gradually resume your normal / prescribed diet. DO NOT eat or drink until your gag reflex returns.    ** If you have a sore throat you may use lozenges, or salt water gargles.        ONCE YOU ARE HOME, IF YOU SHOULD HAVE:    Difficulty in breathing, persistent nausea or vomiting, bleeding you feel is excessive, or pain that is unusual, increased abdominal bloating, or any swelling, fever / chills, call your physician. If you cannot contact your physician, but feel that your signs and symptoms need a physician's attention, go to the Emergency Department.      FOLLOW-UP:      Please follow up with   as scheduled.     Call Dr. Hernadez  if there are any respiratory concerns.  [phone]    BRONCHOSCOPY:    1) Nothing to eat or drink for 2 hours after procedure. Then start with sips of water, if no  at the IV site - call if it remains red, firm or there is drainage             FEMALES OF CHILDBEARING AGE WHO ARE TAKING BIRTH CONTROL PILLS:  You may have received a medication during your procedure that interferes with the   actions of birth control pills (Bridion or Emend). Use some other kind of birth control in addition to your pills, like a condom, for 1 month after your procedure to prevent unwanted pregnancy.    The following instructions are to be followed if you have a known history or diagnosis of sleep apnea:  For all sleep apnea patients:  ? Sleep on your side or sitting up in a chair whenever possible, especially the first 24 hours after surgery.  ? Use only medicines prescribed by your doctor.    ? Do not drink alcohol.  ? If you have a dental device to assist you while at rest, use it at all times for the first 24 hours.  For patients using CPAP machines:  ? Use your CPAP machine during all periods of sleep as usual.  ? Use your CPAP machine during all periods of daytime rest while on pain medicines.  ** Follow up with your primary care doctor for continued care.    IF YOU DO NOT TAKE ALL OF YOUR NARCOTIC PAIN MEDICATION, please dispose of them responsibly. There are drop off boxes in the Emergency Departments 24/7 at both [facility]. If these locations are not convenient, other options for discarding them can be found at:  [email or web address]    Hospital or office staff may NOT accept any medications to drop off in the cabinet for you.What is a Surgical Site Infection or  (SSI)?        A surgical site infection (SSI) is an infection that occurs after surgery in the part of the body where the surgery took place. Most patients who have surgery do not develop an infection. However, infections can develop in about 1-3 cases for every 100 patients who have had surgery.  Our goal is for you to NOT experience any complications and be completely satisfied with your care!    However,

## 2024-04-19 NOTE — DISCHARGE SUMMARY
Brief Discharge Summary    The patient is 53 y.o. female who presented to [facility] with abnormal imaging. She was seen outpatient by Dr. Connor for lung nodule. She presents to [facility] for Robotic bronchoscopy. She has approximately 40-pack-year history of tobacco.  Pt was NPO for procedure    Pt had Robotic bronchoscopy performed with TBBX, needle biopsy, brushings, and BAL. She tolerated the procedure well with no immediately complications. Post-CXR was ordered which showed no PTX.    I will call the patient with final results. She was advised to return to the closest ER if having worsening SOB, chest pain, hemoptysis. Pt is stable for discharge.    Elvin Hernadez MD  [facility] Pulmonary Critical Care

## 2024-04-23 ENCOUNTER — OFFICE VISIT (OUTPATIENT)
Dept: PULMONOLOGY | Age: 54
End: 2024-04-23
Payer: COMMERCIAL

## 2024-04-23 VITALS
HEIGHT: 62 IN | WEIGHT: 109 LBS | RESPIRATION RATE: 16 BRPM | OXYGEN SATURATION: 99 % | BODY MASS INDEX: 20.06 KG/M2 | SYSTOLIC BLOOD PRESSURE: 122 MMHG | DIASTOLIC BLOOD PRESSURE: 64 MMHG | HEART RATE: 82 BPM

## 2024-04-23 DIAGNOSIS — C34.91 PRIMARY LUNG ADENOCARCINOMA, RIGHT (HCC): Primary | ICD-10-CM

## 2024-04-23 DIAGNOSIS — R06.09 DOE (DYSPNEA ON EXERTION): ICD-10-CM

## 2024-04-23 DIAGNOSIS — Z72.0 TOBACCO USE: ICD-10-CM

## 2024-04-23 PROCEDURE — 3074F SYST BP LT 130 MM HG: CPT | Performed by: INTERNAL MEDICINE

## 2024-04-23 PROCEDURE — 99214 OFFICE O/P EST MOD 30 MIN: CPT | Performed by: INTERNAL MEDICINE

## 2024-04-23 PROCEDURE — 3078F DIAST BP <80 MM HG: CPT | Performed by: INTERNAL MEDICINE

## 2024-04-23 RX ORDER — ALBUTEROL SULFATE 90 UG/1
2 AEROSOL, METERED RESPIRATORY (INHALATION) EVERY 4 HOURS PRN
Qty: 1 EACH | Refills: 5 | Status: SHIPPED | OUTPATIENT
Start: 2024-04-23 | End: 2025-04-23

## 2024-04-23 NOTE — PROGRESS NOTES
[facility] Pulmonary and Critical Care    Outpatient Follow Up Note    Subjective:   CHIEF COMPLAINT / HPI:     The patient is 53 y.o. female who is here to discuss pathology results from her Ion navigational bronchoscopy at [facility] on Friday.  Procedure went well and she had no complications.    She continues to have some dyspnea with moderate exertion such as pushing heavy meal tray at work.  She is on Breztri but is coming to the end of the samples her PCP gave her.  She occasionally has wheezing but no cough, chest tightness, or hemoptysis.  She does not have a rescue albuterol inhaler.  She has tried the amount that she smokes.     3/25/2024  Lisa presents today for a new patient visit for abnormal lung cancer screening CT chest.  She has approximately 40-pack-year history of tobacco.  She complains of some dyspnea on exertion however it is partially improved with Breztri although her compliance is spotty.  She has night sweats that she attributes to menopause but no fevers, chills, hemoptysis, anorexia, or weight loss.    Past Medical History:    Past Medical History:   Diagnosis Date    HTN (hypertension)     Hyperlipidemia     Mass of right lung        Social History:      Currently smokes 3/4 pack of cigarettes a day. 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    Family History:  Asthma  Colon cancer  - Dad  Prostate cancer  Lung cancer - Aunt - smoker    Current Medications:  Current Outpatient Medications on File Prior to Visit   Medication Sig Dispense Refill    LIDOCAINE EX Apply topically as needed      esomeprazole (NEXIUM) 20 MG delayed release capsule Take 1 capsule by mouth daily as needed      atorvastatin (LIPITOR) 10 MG tablet Take 1 tablet by mouth daily      CLONIDINE HCL PO Take 0.5 mg by mouth in the morning and at bedtime      amLODIPine (NORVASC) 10 MG tablet Take 1 tablet by mouth daily       No current facility-administered

## 2024-04-24 ENCOUNTER — TELEPHONE (OUTPATIENT)
Dept: PULMONOLOGY | Age: 54
End: 2024-04-24

## 2024-04-24 NOTE — TELEPHONE ENCOUNTER
Order sent to [facility] to call pt to schedule testing.   Patient will need a follow up appt after testing done

## 2024-04-25 ENCOUNTER — TELEPHONE (OUTPATIENT)
Dept: PULMONOLOGY | Age: 54
End: 2024-04-25

## 2024-04-25 DIAGNOSIS — C34.91 PRIMARY LUNG ADENOCARCINOMA, RIGHT (HCC): Primary | ICD-10-CM

## 2024-04-25 NOTE — TELEPHONE ENCOUNTER
The order you sent me was for a creatinine kinase, which isn't what he wants.  That's a measure of muscle injury.  The creatinine is part of a comprehensive metabolic panel, and measures the kidney function, so I entered that.  I had already signed the CK order before I realized the mistake but cancelled it, in case it also prints.          Diagnosis Orders   1. Primary lung adenocarcinoma, right (HCC)  Comprehensive Metabolic Panel

## 2024-05-02 DIAGNOSIS — C34.91 PRIMARY LUNG ADENOCARCINOMA, RIGHT (HCC): ICD-10-CM

## 2024-05-03 ENCOUNTER — HOSPITAL ENCOUNTER (OUTPATIENT)
Dept: MRI IMAGING | Age: 54
Discharge: HOME | End: 2024-05-03
Attending: INTERNAL MEDICINE
Payer: COMMERCIAL

## 2024-05-03 DIAGNOSIS — C34.91 PRIMARY LUNG ADENOCARCINOMA, RIGHT (HCC): ICD-10-CM

## 2024-05-03 PROCEDURE — A9579 GAD-BASE MR CONTRAST NOS,1ML: HCPCS | Performed by: INTERNAL MEDICINE

## 2024-05-03 PROCEDURE — 6360000004 HC RX CONTRAST MEDICATION: Performed by: INTERNAL MEDICINE

## 2024-05-03 PROCEDURE — 70553 MRI BRAIN STEM W/O & W/DYE: CPT

## 2024-05-03 RX ADMIN — GADOTERIDOL 11 ML: 279.3 INJECTION, SOLUTION INTRAVENOUS at 19:50

## 2024-05-06 ENCOUNTER — HOSPITAL ENCOUNTER (OUTPATIENT)
Dept: PULMONOLOGY | Age: 54
Discharge: HOME | End: 2024-05-06
Attending: INTERNAL MEDICINE
Payer: COMMERCIAL

## 2024-05-06 DIAGNOSIS — C34.91 PRIMARY LUNG ADENOCARCINOMA, RIGHT (HCC): ICD-10-CM

## 2024-05-06 PROCEDURE — 94010 BREATHING CAPACITY TEST: CPT

## 2024-05-06 PROCEDURE — 94729 DIFFUSING CAPACITY: CPT

## 2024-05-06 PROCEDURE — 94726 PLETHYSMOGRAPHY LUNG VOLUMES: CPT

## 2024-05-06 PROCEDURE — 94760 N-INVAS EAR/PLS OXIMETRY 1: CPT

## 2024-05-06 PROCEDURE — 94664 DEMO&/EVAL PT USE INHALER: CPT

## 2024-05-09 ENCOUNTER — TELEPHONE (OUTPATIENT)
Dept: PULMONOLOGY | Age: 54
End: 2024-05-09

## 2024-05-09 NOTE — TELEPHONE ENCOUNTER
Lisa called looking for results on recent test-  evaluation of surgical candidacy     Appt has been scheduled    Future Appointments   Date Time Provider Department Center   5/21/2024  9:20 AM Carter Connor MD [place] [facility]

## 2024-05-10 DIAGNOSIS — C34.91 PRIMARY LUNG ADENOCARCINOMA, RIGHT (HCC): Primary | ICD-10-CM

## 2024-05-16 ENCOUNTER — OFFICE VISIT (OUTPATIENT)
Dept: CARDIOLOGY CLINIC | Age: 54
End: 2024-05-16
Payer: COMMERCIAL

## 2024-05-16 ENCOUNTER — HOSPITAL ENCOUNTER (OUTPATIENT)
Age: 54
Setting detail: SURGERY ADMIT
End: 2024-05-16
Attending: STUDENT IN AN ORGANIZED HEALTH CARE EDUCATION/TRAINING PROGRAM | Admitting: STUDENT IN AN ORGANIZED HEALTH CARE EDUCATION/TRAINING PROGRAM
Payer: COMMERCIAL

## 2024-05-16 VITALS
HEART RATE: 90 BPM | SYSTOLIC BLOOD PRESSURE: 110 MMHG | BODY MASS INDEX: 19.65 KG/M2 | DIASTOLIC BLOOD PRESSURE: 72 MMHG | WEIGHT: 106.8 LBS

## 2024-05-16 DIAGNOSIS — I10 PRIMARY HYPERTENSION: ICD-10-CM

## 2024-05-16 DIAGNOSIS — Z01.810 PREOP CARDIOVASCULAR EXAM: ICD-10-CM

## 2024-05-16 DIAGNOSIS — Z72.0 TOBACCO USE: ICD-10-CM

## 2024-05-16 DIAGNOSIS — E78.2 MIXED HYPERLIPIDEMIA: ICD-10-CM

## 2024-05-16 DIAGNOSIS — R94.31 ABNORMAL ELECTROCARDIOGRAPHY: Primary | ICD-10-CM

## 2024-05-16 PROBLEM — C34.91 PRIMARY LUNG ADENOCARCINOMA, RIGHT (HCC): Status: ACTIVE | Noted: 2024-05-16

## 2024-05-16 PROCEDURE — 3074F SYST BP LT 130 MM HG: CPT | Performed by: INTERNAL MEDICINE

## 2024-05-16 PROCEDURE — 93000 ELECTROCARDIOGRAM COMPLETE: CPT | Performed by: INTERNAL MEDICINE

## 2024-05-16 PROCEDURE — 99204 OFFICE O/P NEW MOD 45 MIN: CPT | Performed by: INTERNAL MEDICINE

## 2024-05-16 PROCEDURE — 3078F DIAST BP <80 MM HG: CPT | Performed by: INTERNAL MEDICINE

## 2024-05-16 ASSESSMENT — ENCOUNTER SYMPTOMS
COLOR CHANGE: 0
COUGH: 0
BLOOD IN STOOL: 0
ABDOMINAL PAIN: 0
FACIAL SWELLING: 0
CHEST TIGHTNESS: 0
WHEEZING: 0
ABDOMINAL DISTENTION: 0
VOMITING: 0
SHORTNESS OF BREATH: 0
EYE DISCHARGE: 0

## 2024-05-16 NOTE — ASSESSMENT & PLAN NOTE
Given multiple risk factors along with coronary calcifications seen on CT plan for stress test prior to surgery

## 2024-05-16 NOTE — PROGRESS NOTES
[facility]     Outpatient Cardiology         Patient Name:  Lisa Catalan  Requesting Physician: No admitting provider for patient encounter.  Primary Care Physician: Olive Jasmine MD    Reason for Consultation/Chief Complaint:   Chief Complaint   Patient presents with    Pre-op Exam     Resection of lung          History of Present Illness:    HPI     Lisa Catalanis a 53 y.o. female with PMH of lung cancer, HTN, HLD.   Here for CV risk assessment for lobectomy  HTN, on amlodipine 10 mg daily, well-controlled today.  No side effects  HLD, on Lipitor 10 mg daily, no side effects.  From a preop cardiovascular perspective she seems to be symptomatic having said that she does have multiple risk factors along with coronary calcification seen on CT.  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    HTN (hypertension)     Hyperlipidemia     Mass of right lung        PSH  Past Surgical History:   Procedure Laterality Date    BREAST BIOPSY Right     BRONCHOSCOPY Right 4/19/2024    BRONCHOSCOPY ALVEOLAR LAVAGE ROBOTIC performed by Elvin Hernadez MD at [facility] ENDOSCOPY    BRONCHOSCOPY  4/19/2024    BRONCHOSCOPY ENDOBRONCHIAL ULTRASOUND FINE NEEDLE ASPIRATION ROBOTIC performed by Elvin Hernadez MD at [facility] ENDOSCOPY    BRONCHOSCOPY  4/19/2024    BRONCHOSCOPY BRUSHINGS ROBOTIC performed by Elvin Hernadez MD at [facility] ENDOSCOPY    BRONCHOSCOPY  4/19/2024    BRONCHOSCOPY/TRANSBRONCHIAL LUNG BIOPSY ROBOTIC performed by Elvin Hernadez MD at [facility] ENDOSCOPY    BUNIONECTOMY Left     LAPAROSCOPY      NECK MASS EXCISION      throat    TUBAL LIGATION          Social HIstory  Social History     Tobacco Use    Smoking status: Every Day     Current packs/day: 1.00     Average packs/day: 1 pack/day for 40.4 years (40.4 ttl pk-yrs)     Types: Cigarettes     Start date: 1984    Smokeless tobacco:

## 2024-05-17 ENCOUNTER — TELEPHONE (OUTPATIENT)
Dept: VASCULAR SURGERY | Age: 54
End: 2024-05-17

## 2024-05-17 VITALS — WEIGHT: 106 LBS | BODY MASS INDEX: 19.51 KG/M2 | HEIGHT: 62 IN

## 2024-05-17 NOTE — TELEPHONE ENCOUNTER
Pt called wanting to know what day and time she needs to come in for her blood work pt can be reached at [phone]

## 2024-05-17 NOTE — TELEPHONE ENCOUNTER
Called patient and explained that PAT department would be reaching out to her to schedule appointment for lab work.  She is aware and agreeable with plan.

## 2024-05-20 ENCOUNTER — HOSPITAL ENCOUNTER (OUTPATIENT)
Dept: PREADMISSION TESTING | Age: 54
Setting detail: SURGERY ADMIT
Discharge: HOME | End: 2024-05-24
Admitting: STUDENT IN AN ORGANIZED HEALTH CARE EDUCATION/TRAINING PROGRAM
Payer: COMMERCIAL

## 2024-05-20 ENCOUNTER — HOSPITAL ENCOUNTER (OUTPATIENT)
Age: 54
Discharge: HOME | End: 2024-05-22
Attending: INTERNAL MEDICINE
Payer: COMMERCIAL

## 2024-05-20 ENCOUNTER — HOSPITAL ENCOUNTER (OUTPATIENT)
Dept: NUCLEAR MEDICINE | Age: 54
Discharge: HOME | End: 2024-05-20
Attending: INTERNAL MEDICINE
Payer: COMMERCIAL

## 2024-05-20 ENCOUNTER — HOSPITAL ENCOUNTER (OUTPATIENT)
Dept: GENERAL RADIOLOGY | Age: 54
Discharge: HOME | End: 2024-05-20
Payer: COMMERCIAL

## 2024-05-20 DIAGNOSIS — R94.31 ABNORMAL ELECTROCARDIOGRAPHY: ICD-10-CM

## 2024-05-20 DIAGNOSIS — C34.91 PRIMARY LUNG ADENOCARCINOMA, RIGHT (HCC): ICD-10-CM

## 2024-05-20 DIAGNOSIS — Z01.810 PREOP CARDIOVASCULAR EXAM: ICD-10-CM

## 2024-05-20 LAB
ABO + RH BLD: NORMAL
ALBUMIN SERPL-MCNC: 4 G/DL (ref 3.4–5)
ALBUMIN/GLOB SERPL: 1.3 {RATIO} (ref 1.1–2.2)
ALP SERPL-CCNC: 126 U/L (ref 40–129)
ALT SERPL-CCNC: 12 U/L (ref 10–40)
ANION GAP SERPL CALCULATED.3IONS-SCNC: 10 MMOL/L (ref 3–16)
APTT BLD: 29.6 SEC (ref 22.1–36.4)
AST SERPL-CCNC: 18 U/L (ref 15–37)
BASOPHILS # BLD: 0 K/UL (ref 0–0.2)
BASOPHILS NFR BLD: 0.2 %
BILIRUB SERPL-MCNC: 0.6 MG/DL (ref 0–1)
BILIRUB UR QL STRIP.AUTO: NEGATIVE
BLD GP AB SCN SERPL QL: NORMAL
BUN SERPL-MCNC: 10 MG/DL (ref 7–20)
CALCIUM SERPL-MCNC: 9.6 MG/DL (ref 8.3–10.6)
CHLORIDE SERPL-SCNC: 105 MMOL/L (ref 99–110)
CLARITY UR: CLEAR
CO2 SERPL-SCNC: 25 MMOL/L (ref 21–32)
COLOR UR: YELLOW
CREAT SERPL-MCNC: <0.5 MG/DL (ref 0.6–1.1)
DEPRECATED RDW RBC AUTO: 14.2 % (ref 12.4–15.4)
EOSINOPHIL # BLD: 0.1 K/UL (ref 0–0.6)
EOSINOPHIL NFR BLD: 1.3 %
GFR SERPLBLD CREATININE-BSD FMLA CKD-EPI: >90 ML/MIN/{1.73_M2}
GLUCOSE SERPL-MCNC: 110 MG/DL (ref 70–99)
GLUCOSE UR STRIP.AUTO-MCNC: NEGATIVE MG/DL
HCT VFR BLD AUTO: 35.5 % (ref 36–48)
HGB BLD-MCNC: 12.3 G/DL (ref 12–16)
HGB UR QL STRIP.AUTO: NEGATIVE
INR PPP: 0.87 (ref 0.85–1.15)
KETONES UR STRIP.AUTO-MCNC: NEGATIVE MG/DL
LEUKOCYTE ESTERASE UR QL STRIP.AUTO: NEGATIVE
LYMPHOCYTES # BLD: 1.1 K/UL (ref 1–5.1)
LYMPHOCYTES NFR BLD: 24.8 %
MCH RBC QN AUTO: 34.9 PG (ref 26–34)
MCHC RBC AUTO-ENTMCNC: 34.6 G/DL (ref 31–36)
MCV RBC AUTO: 100.6 FL (ref 80–100)
MONOCYTES # BLD: 0.5 K/UL (ref 0–1.3)
MONOCYTES NFR BLD: 10.2 %
NEUTROPHILS # BLD: 2.8 K/UL (ref 1.7–7.7)
NEUTROPHILS NFR BLD: 63.5 %
NITRITE UR QL STRIP.AUTO: NEGATIVE
NUC STRESS EJECTION FRACTION: 70 %
NUC STRESS LV EDV: 83 ML (ref 56–104)
NUC STRESS LV ESV: 25 ML (ref 19–49)
NUC STRESS LV MASS: 117 G
NUC STRESS LV STROKE VOLUME: 58 ML
PH UR STRIP.AUTO: 6.5 [PH] (ref 5–8)
PLATELET # BLD AUTO: 231 K/UL (ref 135–450)
PMV BLD AUTO: 7.4 FL (ref 5–10.5)
POTASSIUM SERPL-SCNC: 4 MMOL/L (ref 3.5–5.1)
PROT SERPL-MCNC: 7 G/DL (ref 6.4–8.2)
PROT UR STRIP.AUTO-MCNC: NEGATIVE MG/DL
PROTHROMBIN TIME: 12 SEC (ref 11.9–14.9)
RBC # BLD AUTO: 3.53 M/UL (ref 4–5.2)
SODIUM SERPL-SCNC: 140 MMOL/L (ref 136–145)
SP GR UR STRIP.AUTO: 1.02 (ref 1–1.03)
STRESS BASELINE DIAS BP: 87 MMHG
STRESS BASELINE HR: 58 BPM
STRESS BASELINE SYS BP: 137 MMHG
STRESS ESTIMATED WORKLOAD: 7 METS
STRESS PEAK DIAS BP: 104 MMHG
STRESS PEAK SYS BP: 204 MMHG
STRESS PERCENT HR ACHIEVED: 92 %
STRESS POST PEAK HR: 155 BPM
STRESS RATE PRESSURE PRODUCT: NORMAL BPM*MMHG
STRESS ST DEPRESSION: 0 MM
STRESS TARGET HR: 168 BPM
UA DIPSTICK W REFLEX MICRO PNL UR: NORMAL
URN SPEC COLLECT METH UR: NORMAL
UROBILINOGEN UR STRIP-ACNC: 0.2 E.U./DL
WBC # BLD AUTO: 4.5 K/UL (ref 4–11)

## 2024-05-20 PROCEDURE — 36415 COLL VENOUS BLD VENIPUNCTURE: CPT

## 2024-05-20 PROCEDURE — 93016 CV STRESS TEST SUPVJ ONLY: CPT | Performed by: INTERNAL MEDICINE

## 2024-05-20 PROCEDURE — 80053 COMPREHEN METABOLIC PANEL: CPT

## 2024-05-20 PROCEDURE — 93017 CV STRESS TEST TRACING ONLY: CPT

## 2024-05-20 PROCEDURE — A9502 TC99M TETROFOSMIN: HCPCS | Performed by: INTERNAL MEDICINE

## 2024-05-20 PROCEDURE — 93018 CV STRESS TEST I&R ONLY: CPT | Performed by: INTERNAL MEDICINE

## 2024-05-20 PROCEDURE — 86850 RBC ANTIBODY SCREEN: CPT

## 2024-05-20 PROCEDURE — 81003 URINALYSIS AUTO W/O SCOPE: CPT

## 2024-05-20 PROCEDURE — 85730 THROMBOPLASTIN TIME PARTIAL: CPT

## 2024-05-20 PROCEDURE — 86900 BLOOD TYPING SEROLOGIC ABO: CPT

## 2024-05-20 PROCEDURE — 85610 PROTHROMBIN TIME: CPT

## 2024-05-20 PROCEDURE — 87086 URINE CULTURE/COLONY COUNT: CPT

## 2024-05-20 PROCEDURE — 78452 HT MUSCLE IMAGE SPECT MULT: CPT

## 2024-05-20 PROCEDURE — 3430000000 HC RX DIAGNOSTIC RADIOPHARMACEUTICAL: Performed by: INTERNAL MEDICINE

## 2024-05-20 PROCEDURE — 85025 COMPLETE CBC W/AUTO DIFF WBC: CPT

## 2024-05-20 PROCEDURE — 86901 BLOOD TYPING SEROLOGIC RH(D): CPT

## 2024-05-20 PROCEDURE — 83036 HEMOGLOBIN GLYCOSYLATED A1C: CPT

## 2024-05-20 PROCEDURE — 71046 X-RAY EXAM CHEST 2 VIEWS: CPT

## 2024-05-20 PROCEDURE — 78452 HT MUSCLE IMAGE SPECT MULT: CPT | Performed by: INTERNAL MEDICINE

## 2024-05-20 RX ADMIN — TETROFOSMIN 11.4 MILLICURIE: 1.38 INJECTION, POWDER, LYOPHILIZED, FOR SOLUTION INTRAVENOUS at 13:25

## 2024-05-20 RX ADMIN — TETROFOSMIN 30.5 MILLICURIE: 1.38 INJECTION, POWDER, LYOPHILIZED, FOR SOLUTION INTRAVENOUS at 14:38

## 2024-05-20 NOTE — PROGRESS NOTES
Lisa Hossein    Age 53 y.o.    female    1970    MRN [id number]    5/23/2024  Arrival Time_____________  OR Time____________250 Min     Procedure(s):  ROBOTIC RIGHT THORACOSCOPY, LOBECTOMY RIGHT UPPER LOBE WITH LYMPH NODE DISSECTION AND CRYOABLATION                      General   Surgeon(s):  Santo Herbert, MD  Trent, Dara S, MD      DAY ADMIT ___  SDS/OP ___  OUTPT IN BED ___        Phone [phone] (home)                  PCP _____________________ Phone_________________ Epic ( ) Epic CE ( ) Appt ________    NOTES: _________________________________________ Consult/Cardio _______________    ____________________________________________________________________________    ____________________________________________________________________________  PAT APPT DATE:________ TIME: ________  FAXED QAD: _______  (__) H&P w/ Hospitalist    (__) PAT orders in EPIC    (__) Meet with PAT nurse  __________________________________________________________________________  Preop Nurse phone screen complete: _____________  (__) CBC     (__) W/ DIFF ___________  (__) CT CHEST  __________   (__) Hgb A1C    ___________  (__) CHEST X RAY   __________  (__) LIPID PROFILE  ___________  (__) EKG   __________  (__) PT-INR / APTT  ___________  (__) PFT's   __________  (__) BMP   ___________  (__) CAROTIDS  __________  (__) CMP   ___________  (__) VEIN MAPPING  __________  (__) U/A   ___________  (__) HISTORY & PHYSICAL __________  (__) URINE C & S  ___________  (__) CARDIAC CLEARANCE __________  (__) U/A W/ FLEX  ___________  (__) PULM. CLEARANCE __________  (__) SERUM PREGNANCY ___________  (__) Preop Orders in EPIC __________  (__) TYPE & SCREEN __________repeat ( ) (__)  __________________ __________  (__) Albumin   ___________  (__)  __________________ __________  (__) TRANSFERRIN  ___________  (__)  __________________ __________  (__) LIVER PROFILE  ___________  (__) URINE PREG DOS __________  (__) MRSA NASAL 
         -If you have a Living Will and Durable Power of  for Healthcare, please bring in a copy to be scanned at registration.              -Notify your Surgeon if you develop any illness between now and the surgery date, cough, cold, fever, sore throat, nausea, vomiting, etc.  Please notify your                surgeon if you experience dizziness, shortness of breath or blurred vision between now & the time of your surgery.              -DO NOT shave your operative site less than 4 days prior to surgery. For face & neck surgery, men may use an electric razor up to 2 days prior to surgery.   -To help prevent infection, change your sheets the night before surgery. Also, shower the night before & morning of surgery using an antibacterial     soap (Dial or Safeguard).   Do not apply lotion after shower or day of surgery.              -To provide excellent care visitors will be limited to two per room at any given time.              -Please bring your picture ID and insurance card for registration prior to arriving to second floor surgery department.              -If you use a CPAP/BiPAP please bring with you on the day of the surgery. If you use oxygen, please bring portable tank with you.              -For your convenience [facility] has an outpatient pharmacy on site to fill your prescriptions prior to 5 pm.              -Bring a complete list of all your medications with name and dose including any supplements.              Visitor policy: May have 2-3 visitors in Surgery Waiting Room. Visiting hours are 8a-8p. Overnight visitors will be at the discretion of the unit nurse.    No visitors under the age of 14 permitted.     *Please call [facility] Preadmission Testing Department for any further questions  [phone] Saundra Vincent RN    [facility] - MAIN ENTRANCE   [address] [place] [address], [facility]   [id number]        Email sent: no email, copy given to patient at PAT visit 5/20/24

## 2024-05-21 ENCOUNTER — TELEPHONE (OUTPATIENT)
Dept: CARDIOLOGY CLINIC | Age: 54
End: 2024-05-21

## 2024-05-21 DIAGNOSIS — R06.02 SOB (SHORTNESS OF BREATH): ICD-10-CM

## 2024-05-21 DIAGNOSIS — R94.39 ABNORMAL STRESS TEST: Primary | ICD-10-CM

## 2024-05-21 LAB
BACTERIA UR CULT: NORMAL
EST. AVERAGE GLUCOSE BLD GHB EST-MCNC: 108.3 MG/DL
HBA1C MFR BLD: 5.4 %

## 2024-05-23 DIAGNOSIS — Z01.818 PRE-OP TESTING: Primary | ICD-10-CM

## 2024-05-23 PROBLEM — R06.02 SOB (SHORTNESS OF BREATH): Status: ACTIVE | Noted: 2024-05-21

## 2024-05-23 PROBLEM — R94.39 ABNORMAL STRESS TEST: Status: ACTIVE | Noted: 2024-05-21

## 2024-05-23 NOTE — TELEPHONE ENCOUNTER
Everythig is updated for   
LM for return call.  
LM for return call.  
Procedure:  [facility]  Doctor:  Dr. Stovall  Date:  5/29/24  Time:  9am  Arrival:  7:30am  Reps:  n/a  Anesthesia:  n/a      Spoke with patient.      
Pt called to check on scheduling this procedure.    [phone]  
Pt returned call. She can be reached at [phone]  
Spoke with patient and reviewed instructions. Verbalized understanding.     Please schedule LHC with Dr. Stovall.   
Discharge instructions will be given to you at the time of your procedure.    10.  For any questions or if you cannot keep this appointment for any reason, please call [phone].

## 2024-05-28 NOTE — PRE-PROCEDURE INSTRUCTIONS
Called patient about procedure. Told to be here at 0730 for procedure at 0900. Must be NPO after midnight but can take morning medication with sips of water. Patient is not on a blood thinner. Told to have a responsible adult with them to take them home and stay with them afterwards, if they do not get admitted to hospital. Also, to bring a current list of medications. No other questions or concerns.

## 2024-05-29 ENCOUNTER — HOSPITAL ENCOUNTER (OUTPATIENT)
Age: 54
Setting detail: OUTPATIENT SURGERY
Discharge: HOME | End: 2024-05-29
Attending: INTERNAL MEDICINE | Admitting: INTERNAL MEDICINE
Payer: COMMERCIAL

## 2024-05-29 VITALS
BODY MASS INDEX: 19.51 KG/M2 | TEMPERATURE: 98.1 F | HEART RATE: 61 BPM | WEIGHT: 106 LBS | RESPIRATION RATE: 10 BRPM | SYSTOLIC BLOOD PRESSURE: 106 MMHG | OXYGEN SATURATION: 100 % | HEIGHT: 62 IN | DIASTOLIC BLOOD PRESSURE: 78 MMHG

## 2024-05-29 DIAGNOSIS — R06.02 SOB (SHORTNESS OF BREATH): ICD-10-CM

## 2024-05-29 DIAGNOSIS — R94.39 ABNORMAL STRESS TEST: ICD-10-CM

## 2024-05-29 LAB
ECHO BSA: 1.45 M2
EKG ATRIAL RATE: 64 BPM
EKG DIAGNOSIS: NORMAL
EKG P AXIS: 66 DEGREES
EKG P-R INTERVAL: 142 MS
EKG Q-T INTERVAL: 420 MS
EKG QRS DURATION: 84 MS
EKG QTC CALCULATION (BAZETT): 433 MS
EKG R AXIS: -49 DEGREES
EKG T AXIS: 28 DEGREES
EKG VENTRICULAR RATE: 64 BPM
INR BLD: 1.2 (ref 0.84–1.16)

## 2024-05-29 PROCEDURE — 85610 PROTHROMBIN TIME: CPT

## 2024-05-29 PROCEDURE — 2580000003 HC RX 258: Performed by: INTERNAL MEDICINE

## 2024-05-29 PROCEDURE — 93458 L HRT ARTERY/VENTRICLE ANGIO: CPT | Performed by: INTERNAL MEDICINE

## 2024-05-29 PROCEDURE — 99152 MOD SED SAME PHYS/QHP 5/>YRS: CPT | Performed by: INTERNAL MEDICINE

## 2024-05-29 PROCEDURE — 99243 OFF/OP CNSLTJ NEW/EST LOW 30: CPT | Performed by: INTERNAL MEDICINE

## 2024-05-29 PROCEDURE — 6370000000 HC RX 637 (ALT 250 FOR IP): Performed by: INTERNAL MEDICINE

## 2024-05-29 PROCEDURE — 6360000004 HC RX CONTRAST MEDICATION: Performed by: INTERNAL MEDICINE

## 2024-05-29 PROCEDURE — 2709999900 HC NON-CHARGEABLE SUPPLY: Performed by: INTERNAL MEDICINE

## 2024-05-29 PROCEDURE — 6360000002 HC RX W HCPCS: Performed by: INTERNAL MEDICINE

## 2024-05-29 PROCEDURE — 7100000010 HC PHASE II RECOVERY - FIRST 15 MIN: Performed by: INTERNAL MEDICINE

## 2024-05-29 PROCEDURE — C1894 INTRO/SHEATH, NON-LASER: HCPCS | Performed by: INTERNAL MEDICINE

## 2024-05-29 PROCEDURE — 7100000011 HC PHASE II RECOVERY - ADDTL 15 MIN: Performed by: INTERNAL MEDICINE

## 2024-05-29 PROCEDURE — C1769 GUIDE WIRE: HCPCS | Performed by: INTERNAL MEDICINE

## 2024-05-29 PROCEDURE — 2500000003 HC RX 250 WO HCPCS: Performed by: INTERNAL MEDICINE

## 2024-05-29 RX ORDER — SODIUM CHLORIDE 9 MG/ML
INJECTION, SOLUTION INTRAVENOUS PRN
Status: DISCONTINUED | OUTPATIENT
Start: 2024-05-29 | End: 2024-05-29 | Stop reason: HOSPADM

## 2024-05-29 RX ORDER — SODIUM CHLORIDE 9 MG/ML
INJECTION, SOLUTION INTRAVENOUS CONTINUOUS
Status: DISCONTINUED | OUTPATIENT
Start: 2024-05-29 | End: 2024-05-29 | Stop reason: HOSPADM

## 2024-05-29 RX ORDER — MIDAZOLAM HYDROCHLORIDE 1 MG/ML
INJECTION INTRAMUSCULAR; INTRAVENOUS PRN
Status: DISCONTINUED | OUTPATIENT
Start: 2024-05-29 | End: 2024-05-29 | Stop reason: HOSPADM

## 2024-05-29 RX ORDER — SODIUM CHLORIDE 0.9 % (FLUSH) 0.9 %
5-40 SYRINGE (ML) INJECTION PRN
Status: DISCONTINUED | OUTPATIENT
Start: 2024-05-29 | End: 2024-05-29 | Stop reason: HOSPADM

## 2024-05-29 RX ORDER — FENTANYL CITRATE 50 UG/ML
INJECTION, SOLUTION INTRAMUSCULAR; INTRAVENOUS PRN
Status: DISCONTINUED | OUTPATIENT
Start: 2024-05-29 | End: 2024-05-29 | Stop reason: HOSPADM

## 2024-05-29 RX ORDER — SODIUM CHLORIDE 0.9 % (FLUSH) 0.9 %
5-40 SYRINGE (ML) INJECTION EVERY 12 HOURS SCHEDULED
Status: DISCONTINUED | OUTPATIENT
Start: 2024-05-29 | End: 2024-05-29 | Stop reason: HOSPADM

## 2024-05-29 RX ORDER — ACETAMINOPHEN 325 MG/1
650 TABLET ORAL EVERY 4 HOURS PRN
Status: DISCONTINUED | OUTPATIENT
Start: 2024-05-29 | End: 2024-05-29 | Stop reason: HOSPADM

## 2024-05-29 RX ORDER — LIDOCAINE HYDROCHLORIDE 10 MG/ML
INJECTION, SOLUTION EPIDURAL; INFILTRATION; INTRACAUDAL; PERINEURAL PRN
Status: DISCONTINUED | OUTPATIENT
Start: 2024-05-29 | End: 2024-05-29 | Stop reason: HOSPADM

## 2024-05-29 RX ORDER — ASPIRIN 325 MG
325 TABLET ORAL ONCE
Status: COMPLETED | OUTPATIENT
Start: 2024-05-29 | End: 2024-05-29

## 2024-05-29 RX ADMIN — SODIUM CHLORIDE: 9 INJECTION, SOLUTION INTRAVENOUS at 12:00

## 2024-05-29 RX ADMIN — ASPIRIN 325 MG: 325 TABLET ORAL at 08:10

## 2024-05-29 NOTE — CATH APPROPRIATE USE CRITERIA
ACC-NCDR CathPCI V5 Collection Form    Pre-Procedure Information  - Electrocardiac assessment method: ECG     - The assessment result was normal.     Indications and Presentation  - Indication(s) for cath lab visit: other    - Angina type: non-anginal C/P.  - Patient's stress test was positive.  Low risk test  70% EF

## 2024-05-29 NOTE — ANESTHESIA PRE-OP
[facility]  2024, 12:54 PM    H&P Update    I have reviewed the history and physical and examined the patient and find no relevant changes.   I have reviewed with the patient and/or family the risks, benefits, and alternatives to the procedure.    Pre-sedation Assessment    Patient:  Lisa Catalan   :   1970  Intended Procedure: cath and possible intervention  Procedure indications planning to have right upper lung resection for carcinoma.  Abnormal preop stress test.    [facility] reviewed and agreed.  Medications reviewed  Allergies: No Known Allergies    Vitals:    24 1200 24 1215 24 1235 24 1247   BP: 110/76 106/78     Pulse: 52 57 63 60   Resp: 13 14 13 13   Temp:       TempSrc:       SpO2: 97% 99% 100% 100%   Weight:       Height:           Pre-Procedure Assessment/Plan:  ASA 2 - Patient with mild systemic disease with no functional limitations  Mallampati score : Mallampati 1Level of Sedation Plan:Moderate sedation    Post Procedure plan: Return to same level of care  Kumar Stovall M.D.,FACC

## 2024-05-29 NOTE — DISCHARGE INSTRUCTIONS
Instructions    PROCEDURE: Left Heart Catheterization    _X___ You may be drowsy or lightheaded after receiving sedation. DO NOT operate a vehicle (automobile, bicycle, motorcycle, machinery, or power tools), make any important decisions or sign any important/legal documents, or drink alcoholic beverages for the next 24 hours  __X__ We strongly suggest that a responsible adult be with you for the next 24 hours for your protection and safety  _X___ If the intravenous catheter site is painful, apply warm wet compresses on the site until the soreness is relieved and elevate the arm above the heart.  Call your physician if no improvement in 2 to 3 days    DIETARY INSTRUCTIONS:    _X___ Drink extra fluids over the next 24 hours (If not contraindicated by illness or by physician order)  ____ Start with clear liquids and progress to normal diet as you feel like eating.  If you experience nausea or repeated episodes of vomiting, which persist beyond 12-24 hours, notify your doctor        _X___ Resume your previous diet      MEDICATION INSTRUCTIONS:    ____ See Medication Reconciliation Sheet      SPECIAL INSTRUCTIONS:  ________________________________________________________________________________________________________________________________________________________________________________________________________________________                                                                                                                                                                                                                                                                                                Please make sure that you follow-up with your doctor’s office for your results.      FOLLOW-UP APPOINTMENT    Follow up with MD as directed.    Belongings returned to patient and/or family: YES.    The Discharge Instructions have been explained to me.  I understand and can verbalize these instructions.

## 2024-05-29 NOTE — PROCEDURES
PROCEDURE NOTE  Date: 5/29/2024   Name: Lisa Catalan  YOB: 1970    Procedures left heart cath selective coronary arteriogram.    Patient planning to have right upper lung resection for   adenocarcinoma.  Abnormal stress nuclear.    Left cath performed today.  No complications.  Moderate right coronary artery proximal plaque of 10 to 15%.  Left main normal  LAD normal  Circumflex normal  LV function normal by echo with EF of 70% EDP today was 4    Okay to proceed with the surgery on her lungs from cardiac perspective.    Please CC this note to Dr. Snato Herbert  And to Dr. Jeffry Stovall MD, FACC

## 2024-05-30 ENCOUNTER — TELEPHONE (OUTPATIENT)
Dept: CARDIOLOGY CLINIC | Age: 54
End: 2024-05-30

## 2024-05-30 NOTE — TELEPHONE ENCOUNTER
Pt called to set up follow up to her procedure preformed by Dr. Stovall. Pt was informed by her pulmonologist to attempt to get in before her next procedure on 06.04.2024. Pt stated that cardiac clearance had already been approved but was still instructed to attempt to get follow up in before 06.04.2024. Pt did not believe it was for clearance. Pt was certain her follow up was to be with Sia Daniel. Scheduled pt for 06.03.2024 at [facility] as there was an opening but wanted to verify this was the correct course of action.     [phone]

## 2024-05-30 NOTE — TELEPHONE ENCOUNTER
Called and LVM asking patient, unclear why she needs appointment with Dr. Stovall. She is cleared for her procedure. Can follow up with cardiology after her surgery.

## 2024-06-03 ENCOUNTER — ANESTHESIA EVENT (OUTPATIENT)
Dept: OPERATING ROOM | Age: 54
DRG: 165 | End: 2024-06-03
Payer: COMMERCIAL

## 2024-06-04 ENCOUNTER — HOSPITAL ENCOUNTER (INPATIENT)
Age: 54
LOS: 2 days | Discharge: HOME HEALTH | DRG: 165 | End: 2024-06-06
Attending: STUDENT IN AN ORGANIZED HEALTH CARE EDUCATION/TRAINING PROGRAM | Admitting: STUDENT IN AN ORGANIZED HEALTH CARE EDUCATION/TRAINING PROGRAM
Payer: COMMERCIAL

## 2024-06-04 ENCOUNTER — ANESTHESIA (OUTPATIENT)
Dept: OPERATING ROOM | Age: 54
DRG: 165 | End: 2024-06-04
Payer: COMMERCIAL

## 2024-06-04 DIAGNOSIS — R91.1 PULMONARY NODULE 1 CM OR GREATER IN DIAMETER: Primary | ICD-10-CM

## 2024-06-04 DIAGNOSIS — C34.91 PRIMARY LUNG ADENOCARCINOMA, RIGHT (HCC): ICD-10-CM

## 2024-06-04 PROBLEM — C34.11 ADENOCARCINOMA OF UPPER LOBE OF RIGHT LUNG (HCC): Status: ACTIVE | Noted: 2024-06-04

## 2024-06-04 LAB
ABO + RH BLD: NORMAL
BLD GP AB SCN SERPL QL: NORMAL
HCG UR QL: NEGATIVE

## 2024-06-04 PROCEDURE — 2500000003 HC RX 250 WO HCPCS: Performed by: NURSE ANESTHETIST, CERTIFIED REGISTERED

## 2024-06-04 PROCEDURE — 88309 TISSUE EXAM BY PATHOLOGIST: CPT

## 2024-06-04 PROCEDURE — 6360000002 HC RX W HCPCS: Performed by: NURSE ANESTHETIST, CERTIFIED REGISTERED

## 2024-06-04 PROCEDURE — 6370000000 HC RX 637 (ALT 250 FOR IP): Performed by: ANESTHESIOLOGY

## 2024-06-04 PROCEDURE — 3700000000 HC ANESTHESIA ATTENDED CARE: Performed by: STUDENT IN AN ORGANIZED HEALTH CARE EDUCATION/TRAINING PROGRAM

## 2024-06-04 PROCEDURE — 84703 CHORIONIC GONADOTROPIN ASSAY: CPT

## 2024-06-04 PROCEDURE — C2618 PROBE/NEEDLE, CRYO: HCPCS | Performed by: STUDENT IN AN ORGANIZED HEALTH CARE EDUCATION/TRAINING PROGRAM

## 2024-06-04 PROCEDURE — 6370000000 HC RX 637 (ALT 250 FOR IP): Performed by: STUDENT IN AN ORGANIZED HEALTH CARE EDUCATION/TRAINING PROGRAM

## 2024-06-04 PROCEDURE — 2720000010 HC SURG SUPPLY STERILE: Performed by: STUDENT IN AN ORGANIZED HEALTH CARE EDUCATION/TRAINING PROGRAM

## 2024-06-04 PROCEDURE — 6360000002 HC RX W HCPCS: Performed by: STUDENT IN AN ORGANIZED HEALTH CARE EDUCATION/TRAINING PROGRAM

## 2024-06-04 PROCEDURE — 3700000001 HC ADD 15 MINUTES (ANESTHESIA): Performed by: STUDENT IN AN ORGANIZED HEALTH CARE EDUCATION/TRAINING PROGRAM

## 2024-06-04 PROCEDURE — C9290 INJ, BUPIVACAINE LIPOSOME: HCPCS | Performed by: STUDENT IN AN ORGANIZED HEALTH CARE EDUCATION/TRAINING PROGRAM

## 2024-06-04 PROCEDURE — 2709999900 HC NON-CHARGEABLE SUPPLY: Performed by: STUDENT IN AN ORGANIZED HEALTH CARE EDUCATION/TRAINING PROGRAM

## 2024-06-04 PROCEDURE — 0BTC4ZZ RESECTION OF RIGHT UPPER LUNG LOBE, PERCUTANEOUS ENDOSCOPIC APPROACH: ICD-10-PCS | Performed by: STUDENT IN AN ORGANIZED HEALTH CARE EDUCATION/TRAINING PROGRAM

## 2024-06-04 PROCEDURE — 86900 BLOOD TYPING SEROLOGIC ABO: CPT

## 2024-06-04 PROCEDURE — 3600000009 HC SURGERY ROBOT BASE: Performed by: STUDENT IN AN ORGANIZED HEALTH CARE EDUCATION/TRAINING PROGRAM

## 2024-06-04 PROCEDURE — 32674 THORACOSCOPY LYMPH NODE EXC: CPT | Performed by: STUDENT IN AN ORGANIZED HEALTH CARE EDUCATION/TRAINING PROGRAM

## 2024-06-04 PROCEDURE — 6370000000 HC RX 637 (ALT 250 FOR IP)

## 2024-06-04 PROCEDURE — P9045 ALBUMIN (HUMAN), 5%, 250 ML: HCPCS | Performed by: NURSE ANESTHETIST, CERTIFIED REGISTERED

## 2024-06-04 PROCEDURE — S2900 ROBOTIC SURGICAL SYSTEM: HCPCS | Performed by: STUDENT IN AN ORGANIZED HEALTH CARE EDUCATION/TRAINING PROGRAM

## 2024-06-04 PROCEDURE — C1751 CATH, INF, PER/CENT/MIDLINE: HCPCS | Performed by: STUDENT IN AN ORGANIZED HEALTH CARE EDUCATION/TRAINING PROGRAM

## 2024-06-04 PROCEDURE — 6360000002 HC RX W HCPCS

## 2024-06-04 PROCEDURE — 07B74ZZ EXCISION OF THORAX LYMPHATIC, PERCUTANEOUS ENDOSCOPIC APPROACH: ICD-10-PCS | Performed by: STUDENT IN AN ORGANIZED HEALTH CARE EDUCATION/TRAINING PROGRAM

## 2024-06-04 PROCEDURE — 3600000019 HC SURGERY ROBOT ADDTL 15MIN: Performed by: STUDENT IN AN ORGANIZED HEALTH CARE EDUCATION/TRAINING PROGRAM

## 2024-06-04 PROCEDURE — 32663 THORACOSCOPY W/LOBECTOMY: CPT | Performed by: STUDENT IN AN ORGANIZED HEALTH CARE EDUCATION/TRAINING PROGRAM

## 2024-06-04 PROCEDURE — 86901 BLOOD TYPING SEROLOGIC RH(D): CPT

## 2024-06-04 PROCEDURE — 88305 TISSUE EXAM BY PATHOLOGIST: CPT

## 2024-06-04 PROCEDURE — 2000000000 HC ICU R&B

## 2024-06-04 PROCEDURE — 2580000003 HC RX 258: Performed by: ANESTHESIOLOGY

## 2024-06-04 PROCEDURE — 2580000003 HC RX 258: Performed by: NURSE ANESTHETIST, CERTIFIED REGISTERED

## 2024-06-04 PROCEDURE — 86850 RBC ANTIBODY SCREEN: CPT

## 2024-06-04 RX ORDER — LABETALOL HYDROCHLORIDE 5 MG/ML
10 INJECTION, SOLUTION INTRAVENOUS
Status: DISCONTINUED | OUTPATIENT
Start: 2024-06-04 | End: 2024-06-05

## 2024-06-04 RX ORDER — SODIUM CHLORIDE 0.9 % (FLUSH) 0.9 %
5-40 SYRINGE (ML) INJECTION PRN
Status: DISCONTINUED | OUTPATIENT
Start: 2024-06-04 | End: 2024-06-06 | Stop reason: HOSPADM

## 2024-06-04 RX ORDER — ACETAMINOPHEN 500 MG
1000 TABLET ORAL ONCE
Status: COMPLETED | OUTPATIENT
Start: 2024-06-04 | End: 2024-06-04

## 2024-06-04 RX ORDER — SODIUM CHLORIDE 0.9 % (FLUSH) 0.9 %
5-40 SYRINGE (ML) INJECTION EVERY 12 HOURS SCHEDULED
Status: DISCONTINUED | OUTPATIENT
Start: 2024-06-04 | End: 2024-06-04 | Stop reason: HOSPADM

## 2024-06-04 RX ORDER — DIPHENHYDRAMINE HYDROCHLORIDE 50 MG/ML
12.5 INJECTION INTRAMUSCULAR; INTRAVENOUS
Status: DISCONTINUED | OUTPATIENT
Start: 2024-06-04 | End: 2024-06-05

## 2024-06-04 RX ORDER — ONDANSETRON 2 MG/ML
INJECTION INTRAMUSCULAR; INTRAVENOUS PRN
Status: DISCONTINUED | OUTPATIENT
Start: 2024-06-04 | End: 2024-06-04 | Stop reason: SDUPTHER

## 2024-06-04 RX ORDER — SODIUM CHLORIDE, SODIUM LACTATE, POTASSIUM CHLORIDE, CALCIUM CHLORIDE 600; 310; 30; 20 MG/100ML; MG/100ML; MG/100ML; MG/100ML
INJECTION, SOLUTION INTRAVENOUS CONTINUOUS
Status: DISCONTINUED | OUTPATIENT
Start: 2024-06-04 | End: 2024-06-04 | Stop reason: HOSPADM

## 2024-06-04 RX ORDER — ONDANSETRON 2 MG/ML
4 INJECTION INTRAMUSCULAR; INTRAVENOUS
Status: ACTIVE | OUTPATIENT
Start: 2024-06-04 | End: 2024-06-05

## 2024-06-04 RX ORDER — MIDAZOLAM HYDROCHLORIDE 1 MG/ML
INJECTION INTRAMUSCULAR; INTRAVENOUS PRN
Status: DISCONTINUED | OUTPATIENT
Start: 2024-06-04 | End: 2024-06-04 | Stop reason: SDUPTHER

## 2024-06-04 RX ORDER — OXYCODONE HYDROCHLORIDE 5 MG/1
10 TABLET ORAL PRN
Status: ACTIVE | OUTPATIENT
Start: 2024-06-04 | End: 2024-06-04

## 2024-06-04 RX ORDER — SODIUM CHLORIDE 9 MG/ML
INJECTION, SOLUTION INTRAVENOUS PRN
Status: DISCONTINUED | OUTPATIENT
Start: 2024-06-04 | End: 2024-06-04 | Stop reason: HOSPADM

## 2024-06-04 RX ORDER — CEFAZOLIN SODIUM IN 0.9 % NACL 2 G/100 ML
2000 PLASTIC BAG, INJECTION (ML) INTRAVENOUS
Status: COMPLETED | OUTPATIENT
Start: 2024-06-04 | End: 2024-06-04

## 2024-06-04 RX ORDER — MIDAZOLAM HYDROCHLORIDE 1 MG/ML
2 INJECTION INTRAMUSCULAR; INTRAVENOUS
Status: DISCONTINUED | OUTPATIENT
Start: 2024-06-04 | End: 2024-06-04 | Stop reason: HOSPADM

## 2024-06-04 RX ORDER — SODIUM CHLORIDE 0.9 % (FLUSH) 0.9 %
5-40 SYRINGE (ML) INJECTION PRN
Status: DISCONTINUED | OUTPATIENT
Start: 2024-06-04 | End: 2024-06-04 | Stop reason: HOSPADM

## 2024-06-04 RX ORDER — HYDROMORPHONE HYDROCHLORIDE 2 MG/ML
INJECTION, SOLUTION INTRAMUSCULAR; INTRAVENOUS; SUBCUTANEOUS PRN
Status: DISCONTINUED | OUTPATIENT
Start: 2024-06-04 | End: 2024-06-04 | Stop reason: SDUPTHER

## 2024-06-04 RX ORDER — MORPHINE SULFATE 2 MG/ML
2 INJECTION, SOLUTION INTRAMUSCULAR; INTRAVENOUS
Status: DISCONTINUED | OUTPATIENT
Start: 2024-06-04 | End: 2024-06-06 | Stop reason: HOSPADM

## 2024-06-04 RX ORDER — ROCURONIUM BROMIDE 10 MG/ML
INJECTION, SOLUTION INTRAVENOUS PRN
Status: DISCONTINUED | OUTPATIENT
Start: 2024-06-04 | End: 2024-06-04 | Stop reason: SDUPTHER

## 2024-06-04 RX ORDER — OXYCODONE HYDROCHLORIDE 5 MG/1
5 TABLET ORAL EVERY 6 HOURS PRN
Status: DISCONTINUED | OUTPATIENT
Start: 2024-06-04 | End: 2024-06-06 | Stop reason: HOSPADM

## 2024-06-04 RX ORDER — ALBUMIN, HUMAN INJ 5% 5 %
SOLUTION INTRAVENOUS PRN
Status: DISCONTINUED | OUTPATIENT
Start: 2024-06-04 | End: 2024-06-04 | Stop reason: SDUPTHER

## 2024-06-04 RX ORDER — CHLORDIAZEPOXIDE HYDROCHLORIDE 25 MG/1
25 CAPSULE, GELATIN COATED ORAL EVERY 6 HOURS
Status: DISCONTINUED | OUTPATIENT
Start: 2024-06-04 | End: 2024-06-06 | Stop reason: HOSPADM

## 2024-06-04 RX ORDER — OXYCODONE HYDROCHLORIDE 5 MG/1
5 TABLET ORAL PRN
Status: ACTIVE | OUTPATIENT
Start: 2024-06-04 | End: 2024-06-04

## 2024-06-04 RX ORDER — KETOROLAC TROMETHAMINE 30 MG/ML
INJECTION, SOLUTION INTRAMUSCULAR; INTRAVENOUS PRN
Status: DISCONTINUED | OUTPATIENT
Start: 2024-06-04 | End: 2024-06-04 | Stop reason: SDUPTHER

## 2024-06-04 RX ORDER — CHLORDIAZEPOXIDE HYDROCHLORIDE 25 MG/1
25 CAPSULE, GELATIN COATED ORAL EVERY 6 HOURS PRN
Status: DISCONTINUED | OUTPATIENT
Start: 2024-06-04 | End: 2024-06-04

## 2024-06-04 RX ORDER — LIDOCAINE HYDROCHLORIDE 10 MG/ML
2 INJECTION, SOLUTION INFILTRATION; PERINEURAL
Status: DISCONTINUED | OUTPATIENT
Start: 2024-06-04 | End: 2024-06-04 | Stop reason: HOSPADM

## 2024-06-04 RX ORDER — MORPHINE SULFATE 4 MG/ML
4 INJECTION, SOLUTION INTRAMUSCULAR; INTRAVENOUS
Status: DISCONTINUED | OUTPATIENT
Start: 2024-06-04 | End: 2024-06-06 | Stop reason: HOSPADM

## 2024-06-04 RX ORDER — FENTANYL CITRATE 50 UG/ML
INJECTION, SOLUTION INTRAMUSCULAR; INTRAVENOUS PRN
Status: DISCONTINUED | OUTPATIENT
Start: 2024-06-04 | End: 2024-06-04 | Stop reason: SDUPTHER

## 2024-06-04 RX ORDER — SODIUM CHLORIDE 0.9 % (FLUSH) 0.9 %
5-40 SYRINGE (ML) INJECTION EVERY 12 HOURS SCHEDULED
Status: DISCONTINUED | OUTPATIENT
Start: 2024-06-04 | End: 2024-06-06 | Stop reason: HOSPADM

## 2024-06-04 RX ORDER — LIDOCAINE HYDROCHLORIDE 20 MG/ML
INJECTION, SOLUTION INFILTRATION; PERINEURAL PRN
Status: DISCONTINUED | OUTPATIENT
Start: 2024-06-04 | End: 2024-06-04 | Stop reason: SDUPTHER

## 2024-06-04 RX ORDER — MEPERIDINE HYDROCHLORIDE 50 MG/ML
12.5 INJECTION INTRAMUSCULAR; INTRAVENOUS; SUBCUTANEOUS EVERY 5 MIN PRN
Status: DISCONTINUED | OUTPATIENT
Start: 2024-06-04 | End: 2024-06-05

## 2024-06-04 RX ORDER — GLYCOPYRROLATE 0.2 MG/ML
INJECTION INTRAMUSCULAR; INTRAVENOUS PRN
Status: DISCONTINUED | OUTPATIENT
Start: 2024-06-04 | End: 2024-06-04 | Stop reason: SDUPTHER

## 2024-06-04 RX ORDER — LIDOCAINE HYDROCHLORIDE 10 MG/ML
1 INJECTION, SOLUTION EPIDURAL; INFILTRATION; INTRACAUDAL; PERINEURAL
Status: DISCONTINUED | OUTPATIENT
Start: 2024-06-04 | End: 2024-06-04 | Stop reason: HOSPADM

## 2024-06-04 RX ORDER — SODIUM CHLORIDE, SODIUM LACTATE, POTASSIUM CHLORIDE, CALCIUM CHLORIDE 600; 310; 30; 20 MG/100ML; MG/100ML; MG/100ML; MG/100ML
INJECTION, SOLUTION INTRAVENOUS CONTINUOUS PRN
Status: DISCONTINUED | OUTPATIENT
Start: 2024-06-04 | End: 2024-06-04 | Stop reason: SDUPTHER

## 2024-06-04 RX ORDER — PROPOFOL 10 MG/ML
INJECTION, EMULSION INTRAVENOUS PRN
Status: DISCONTINUED | OUTPATIENT
Start: 2024-06-04 | End: 2024-06-04 | Stop reason: SDUPTHER

## 2024-06-04 RX ORDER — SODIUM CHLORIDE 9 MG/ML
INJECTION, SOLUTION INTRAVENOUS PRN
Status: DISCONTINUED | OUTPATIENT
Start: 2024-06-04 | End: 2024-06-06 | Stop reason: HOSPADM

## 2024-06-04 RX ORDER — DEXAMETHASONE SODIUM PHOSPHATE 4 MG/ML
INJECTION, SOLUTION INTRA-ARTICULAR; INTRALESIONAL; INTRAMUSCULAR; INTRAVENOUS; SOFT TISSUE PRN
Status: DISCONTINUED | OUTPATIENT
Start: 2024-06-04 | End: 2024-06-04 | Stop reason: SDUPTHER

## 2024-06-04 RX ORDER — KETAMINE HCL IN NACL, ISO-OSM 100MG/10ML
SYRINGE (ML) INJECTION PRN
Status: DISCONTINUED | OUTPATIENT
Start: 2024-06-04 | End: 2024-06-04 | Stop reason: SDUPTHER

## 2024-06-04 RX ORDER — NALOXONE HYDROCHLORIDE 0.4 MG/ML
INJECTION, SOLUTION INTRAMUSCULAR; INTRAVENOUS; SUBCUTANEOUS PRN
Status: DISCONTINUED | OUTPATIENT
Start: 2024-06-04 | End: 2024-06-06 | Stop reason: HOSPADM

## 2024-06-04 RX ADMIN — ACETAMINOPHEN 1000 MG: 500 TABLET ORAL at 11:57

## 2024-06-04 RX ADMIN — HYDROMORPHONE HYDROCHLORIDE 0.5 MG: 2 INJECTION, SOLUTION INTRAMUSCULAR; INTRAVENOUS; SUBCUTANEOUS at 16:37

## 2024-06-04 RX ADMIN — SODIUM CHLORIDE, SODIUM LACTATE, POTASSIUM CHLORIDE, AND CALCIUM CHLORIDE: .6; .31; .03; .02 INJECTION, SOLUTION INTRAVENOUS at 16:23

## 2024-06-04 RX ADMIN — SODIUM CHLORIDE, SODIUM LACTATE, POTASSIUM CHLORIDE, AND CALCIUM CHLORIDE: .6; .31; .03; .02 INJECTION, SOLUTION INTRAVENOUS at 13:10

## 2024-06-04 RX ADMIN — Medication 2 AMPULE: at 11:53

## 2024-06-04 RX ADMIN — ONDANSETRON 4 MG: 2 INJECTION INTRAMUSCULAR; INTRAVENOUS at 13:28

## 2024-06-04 RX ADMIN — GLYCOPYRROLATE 0.3 MG: 0.2 INJECTION, SOLUTION INTRAMUSCULAR; INTRAVENOUS at 15:19

## 2024-06-04 RX ADMIN — ROCURONIUM BROMIDE 30 MG: 50 INJECTION, SOLUTION INTRAVENOUS at 13:55

## 2024-06-04 RX ADMIN — Medication 30 MG: at 14:37

## 2024-06-04 RX ADMIN — MORPHINE SULFATE 4 MG: 4 INJECTION, SOLUTION INTRAMUSCULAR; INTRAVENOUS at 17:22

## 2024-06-04 RX ADMIN — ROCURONIUM BROMIDE 50 MG: 50 INJECTION, SOLUTION INTRAVENOUS at 13:17

## 2024-06-04 RX ADMIN — ROCURONIUM BROMIDE 20 MG: 50 INJECTION, SOLUTION INTRAVENOUS at 14:35

## 2024-06-04 RX ADMIN — MORPHINE SULFATE 4 MG: 4 INJECTION, SOLUTION INTRAMUSCULAR; INTRAVENOUS at 20:28

## 2024-06-04 RX ADMIN — SUGAMMADEX 200 MG: 100 INJECTION, SOLUTION INTRAVENOUS at 16:23

## 2024-06-04 RX ADMIN — PROPOFOL 200 MG: 10 INJECTION, EMULSION INTRAVENOUS at 13:17

## 2024-06-04 RX ADMIN — ALBUMIN (HUMAN) 12.5 G: 12.5 INJECTION, SOLUTION INTRAVENOUS at 14:28

## 2024-06-04 RX ADMIN — CHLORDIAZEPOXIDE HYDROCHLORIDE 25 MG: 25 CAPSULE ORAL at 21:59

## 2024-06-04 RX ADMIN — HYDROMORPHONE HYDROCHLORIDE 0.5 MG: 2 INJECTION, SOLUTION INTRAMUSCULAR; INTRAVENOUS; SUBCUTANEOUS at 16:06

## 2024-06-04 RX ADMIN — KETOROLAC TROMETHAMINE 30 MG: 30 INJECTION, SOLUTION INTRAMUSCULAR; INTRAVENOUS at 16:23

## 2024-06-04 RX ADMIN — FENTANYL CITRATE 100 MCG: 50 INJECTION, SOLUTION INTRAMUSCULAR; INTRAVENOUS at 13:17

## 2024-06-04 RX ADMIN — MIDAZOLAM 2 MG: 1 INJECTION INTRAMUSCULAR; INTRAVENOUS at 13:10

## 2024-06-04 RX ADMIN — FENTANYL CITRATE 100 MCG: 50 INJECTION, SOLUTION INTRAMUSCULAR; INTRAVENOUS at 13:40

## 2024-06-04 RX ADMIN — LIDOCAINE HYDROCHLORIDE 80 MG: 20 INJECTION, SOLUTION INFILTRATION; PERINEURAL at 13:17

## 2024-06-04 RX ADMIN — SODIUM CHLORIDE, PRESERVATIVE FREE 10 ML: 5 INJECTION INTRAVENOUS at 20:31

## 2024-06-04 RX ADMIN — ALBUMIN (HUMAN) 12.5 G: 12.5 INJECTION, SOLUTION INTRAVENOUS at 14:37

## 2024-06-04 RX ADMIN — Medication 20 MG: at 13:56

## 2024-06-04 RX ADMIN — DEXAMETHASONE SODIUM PHOSPHATE 8 MG: 4 INJECTION, SOLUTION INTRAMUSCULAR; INTRAVENOUS at 13:28

## 2024-06-04 RX ADMIN — CHLORDIAZEPOXIDE HYDROCHLORIDE 25 MG: 25 CAPSULE ORAL at 17:22

## 2024-06-04 RX ADMIN — Medication 2000 MG: at 13:25

## 2024-06-04 ASSESSMENT — LIFESTYLE VARIABLES: SMOKING_STATUS: 1

## 2024-06-04 ASSESSMENT — PAIN SCALES - GENERAL
PAINLEVEL_OUTOF10: 8
PAINLEVEL_OUTOF10: 0
PAINLEVEL_OUTOF10: 2
PAINLEVEL_OUTOF10: 4
PAINLEVEL_OUTOF10: 8

## 2024-06-04 ASSESSMENT — PAIN DESCRIPTION - ORIENTATION
ORIENTATION: RIGHT
ORIENTATION: RIGHT

## 2024-06-04 ASSESSMENT — PAIN DESCRIPTION - DESCRIPTORS: DESCRIPTORS: ACHING

## 2024-06-04 ASSESSMENT — PAIN DESCRIPTION - PAIN TYPE: TYPE: SURGICAL PAIN

## 2024-06-04 ASSESSMENT — PAIN - FUNCTIONAL ASSESSMENT
PAIN_FUNCTIONAL_ASSESSMENT: ACTIVITIES ARE NOT PREVENTED
PAIN_FUNCTIONAL_ASSESSMENT: 0-10

## 2024-06-04 ASSESSMENT — PAIN DESCRIPTION - LOCATION
LOCATION: BACK
LOCATION: BACK

## 2024-06-04 ASSESSMENT — COPD QUESTIONNAIRES: CAT_SEVERITY: MODERATE

## 2024-06-04 NOTE — ANESTHESIA PRE PROCEDURE
Department of Anesthesiology  Preprocedure Note       Name:  Lisa Catalan   Age:  53 y.o.  :  1970                                          MRN:  [id number]         Date:  2024      Surgeon: Surgeon(s):  Santo Herbert MD Seto, Lynn S, MD    Procedure: Procedure(s):  ROBOTIC RIGHT THORACOSCOPY, LOBECTOMY RIGHT UPPER LOBE WITH LYMPH NODE DISSECTION AND CRYOABLATION    Medications prior to admission:   Prior to Admission medications    Medication Sig Start Date End Date Taking? Authorizing Provider   Simethicone (GAS-X PO) Take by mouth as needed (gas)   Yes Ronny Orellana MD   chlorhexidine gluconate (HIBICLENS) 4 % SOLN external solution Apply topically daily Shower with Hibiclens the night before and morning of surgery 24   Santo Herbert MD   gabapentin (NEURONTIN) 300 MG capsule Take 1 capsule by mouth 3 times daily for 7 days. Last dose evening before surgery 24  Santo Herbert MD   Budeson-Glycopyrrol-Formoterol 160-9-4.8 MCG/ACT AERO Inhale 2 puffs into the lungs in the morning and at bedtime 24   Carter Connor MD   albuterol sulfate HFA (PROVENTIL HFA) 108 (90 Base) MCG/ACT inhaler Inhale 2 puffs into the lungs every 4 hours as needed for Wheezing or Shortness of Breath 24  Carter Connor MD   LIDOCAINE EX Apply topically as needed    Ronny Orellana MD   esomeprazole (NEXIUM) 20 MG delayed release capsule Take 1 capsule by mouth daily as needed (gerd)    Ronny Orellana MD   atorvastatin (LIPITOR) 10 MG tablet Take 1 tablet by mouth daily    Ronny Orellana MD   CLONIDINE HCL PO Take 0.5 mg by mouth in the morning and at bedtime    Ronny Orellana MD   amLODIPine (NORVASC) 10 MG tablet Take 1 tablet by mouth daily    Ronny Orellana MD       Current medications:    Current Facility-Administered Medications   Medication Dose Route Frequency Provider Last Rate Last Admin   • lidocaine PF 1 % injection 1 mL  1 mL

## 2024-06-04 NOTE — ANESTHESIA POSTPROCEDURE EVALUATION
Department of Anesthesiology  Postprocedure Note    Patient: Lisa Catalan  MRN: [id number]  YOB: 1970  Date of evaluation: 6/4/2024    Procedure Summary       Date: 06/04/24 Room / Location: [address]    Anesthesia Start: 1310 Anesthesia Stop: 1647    Procedure: ROBOTIC LOBECTOMY RIGHT UPPER LOBE WITH MEDIASTINAL LYMPH NODE DISSECTION AND CRYOABLATION LEVELS 4 THORUGH 8 (Right) Diagnosis:       Primary lung adenocarcinoma, right (HCC)      (Primary lung adenocarcinoma, right (HCC) [C34.91])    Surgeons: Santo Herbert MD Responsible Provider: Baldomero Gallagher MD    Anesthesia Type: general ASA Status: 3            Anesthesia Type: No value filed.    Eren Phase I: Eren Score: 10    Eren Phase II:      Anesthesia Post Evaluation    Patient location during evaluation: PACU  Patient participation: complete - patient participated  Level of consciousness: awake and alert  Airway patency: patent  Nausea & Vomiting: no vomiting and no nausea  Cardiovascular status: hemodynamically stable and blood pressure returned to baseline  Respiratory status: acceptable  Hydration status: euvolemic  Comments: ---------------------------               06/04/24                      1708         ---------------------------   BP:          127/71         Pulse:         70           Resp:          12           Temp:   97.9 °F (36.6 °C)   SpO2:                      ---------------------------    Pain management: adequate    No notable events documented.

## 2024-06-04 NOTE — OP NOTE
Operative Note      Patient: Lisa Catalan  YOB: 1970  MRN: [id number]    Date of Procedure: 6/4/2024    Pre-Op Diagnosis Codes:     * Primary lung adenocarcinoma, right (HCC) [C34.91]    Post-Op Diagnosis: Same       Procedure(s):  ROBOTIC LOBECTOMY RIGHT UPPER LOBE WITH MEDIASTINAL LYMPH NODE DISSECTION AND CRYOABLATION LEVELS 4 THORUGH 8    Surgeon(s):  Santo Herbert MD Seto, Lynn S, MD    Assistant:   Surgical Assistant: Abby Peraza    Anesthesia: General    Estimated Blood Loss (mL): less than 100     Complications: None    Specimens:   ID Type Source Tests Collected by Time Destination   A : LYMPH NODE 8R Tissue Lymph Node SURGICAL PATHOLOGY Santo Herbert MD 6/4/2024 1442    B : LYMPH NODE LEVEL 7 Tissue Lymph Node SURGICAL PATHOLOGY Santo Herbert MD 6/4/2024 1443    C : LYMPH NODE LEVEL 2R Tissue Lymph Node SURGICAL PATHOLOGY Santo Herbert MD 6/4/2024 1448    D : LYMPH NODE LEVEL 4R Tissue Lymph Node SURGICAL PATHOLOGY Santo Herbert MD 6/4/2024 1450    E : LYMPH NODE LEVEL 10R Tissue Lymph Node SURGICAL PATHOLOGY Santo Herbert MD 6/4/2024 1455    F : LYMPH NODE LEVEL 11R Tissue Lymph Node SURGICAL PATHOLOGY Santo Herbert MD 6/4/2024 1532    G : RIGHT UPPER LOBE Tissue Lung SURGICAL PATHOLOGY Santo Herbert MD 6/4/2024 1549        Implants:  * No implants in log *      Drains:   Chest Tube Right Pleural 1 (Active)       Urinary Catheter 06/04/24 Jimenez-Temperature (Active)       Findings:  Infection Present At Time Of Surgery (PATOS) (choose all levels that have infection present):  No infection present  Other Findings: nice fissures    Detailed Description of Procedure:   This procedure was performed with curative intent from an oncologic perspective.  After informed consent was obtained including site marking in the preoperative area, the patient is brought to the operating room placed in supine position and general anesthesia was induced with a

## 2024-06-04 NOTE — H&P
Primary lung adenocarcinoma, right (HCC)  TECHNOLOGIST PROVIDED HISTORY:  Reason for exam:->Pre-op testing  Reason for Exam: Pre-op testing    FINDINGS:  The lungs are without acute focal process.  There is no effusion or  pneumothorax. The cardiomediastinal silhouette is stable. The osseous  structures are stable.    Impression  No acute process.      CT Result (most recent):  CT CHEST HIGH RESOLUTION 03/30/2024    Narrative  CT  OF THE CHEST:    CLINICAL HISTORY: Pulmonary nodule.    TECHNIQUE: Spiral CT images of the chest were obtained. Images were reformatted  in the coronal sagittal planes.  Up-to-date CT equipment and radiation dose  reduction techniques were employed.    COMPARISON: None.    FINDINGS: There is a 17 mm x 16 mm subsolid nodule identified within the right  upper lobe. The solid component of the nodule measures 6 mm. No other nodules  are identified. Central airways are patent.    No pulmonary fibrosis is identified. No consolidation or effusions are  identified.    No axillary, mediastinal or hilar lymphadenopathy is identified. There is some  atherosclerotic calcification of the left anterior descending coronary artery.    No osteolytic or osteoblastic lesions are identified.    Impression  1. There is a 17 mm subsolid nodule identified within the right upper lobe with  a 6 mm nodular component. This can be seen with an adenocarcinoma spectrum  neoplasm. Recommend further evaluation with PET/CT.           ASSESSMENT AND PLAN:  Assessment/Plan:   Right upper lobe adenocarcinoma - preop staging T1b N0 Mx, Stage Ia2. Given its location and her age I would recommend robotic assist right upper lobectomy with mediastinal lymph node dissection. She has acceptable PFTS.   Tobacco abuse - still active smoker. Recommend cessation  Alcohol use - daily, will watch in ICU for signs of withdrawal and plan on a precedex drip and CIWA if necessary.  Cardiac clearance today. If needs cardiac workup further

## 2024-06-05 ENCOUNTER — APPOINTMENT (OUTPATIENT)
Dept: GENERAL RADIOLOGY | Age: 54
DRG: 165 | End: 2024-06-05
Attending: STUDENT IN AN ORGANIZED HEALTH CARE EDUCATION/TRAINING PROGRAM
Payer: COMMERCIAL

## 2024-06-05 LAB
ANION GAP SERPL CALCULATED.3IONS-SCNC: 12 MMOL/L (ref 3–16)
BASOPHILS # BLD: 0 K/UL (ref 0–0.2)
BASOPHILS NFR BLD: 0.3 %
BUN SERPL-MCNC: 7 MG/DL (ref 7–20)
CALCIUM SERPL-MCNC: 9 MG/DL (ref 8.3–10.6)
CHLORIDE SERPL-SCNC: 98 MMOL/L (ref 99–110)
CO2 SERPL-SCNC: 25 MMOL/L (ref 21–32)
CREAT SERPL-MCNC: <0.5 MG/DL (ref 0.6–1.1)
DEPRECATED RDW RBC AUTO: 13.6 % (ref 12.4–15.4)
EOSINOPHIL # BLD: 0 K/UL (ref 0–0.6)
EOSINOPHIL NFR BLD: 0 %
GFR SERPLBLD CREATININE-BSD FMLA CKD-EPI: >90 ML/MIN/{1.73_M2}
GLUCOSE SERPL-MCNC: 118 MG/DL (ref 70–99)
HCT VFR BLD AUTO: 33 % (ref 36–48)
HGB BLD-MCNC: 11.3 G/DL (ref 12–16)
LYMPHOCYTES # BLD: 1 K/UL (ref 1–5.1)
LYMPHOCYTES NFR BLD: 6.7 %
MCH RBC QN AUTO: 34.8 PG (ref 26–34)
MCHC RBC AUTO-ENTMCNC: 34.1 G/DL (ref 31–36)
MCV RBC AUTO: 101.9 FL (ref 80–100)
MONOCYTES # BLD: 0.8 K/UL (ref 0–1.3)
MONOCYTES NFR BLD: 5.3 %
NEUTROPHILS # BLD: 13.6 K/UL (ref 1.7–7.7)
NEUTROPHILS NFR BLD: 87.7 %
PLATELET # BLD AUTO: 250 K/UL (ref 135–450)
PMV BLD AUTO: 6.6 FL (ref 5–10.5)
POTASSIUM SERPL-SCNC: 3.9 MMOL/L (ref 3.5–5.1)
RBC # BLD AUTO: 3.24 M/UL (ref 4–5.2)
SODIUM SERPL-SCNC: 135 MMOL/L (ref 136–145)
WBC # BLD AUTO: 15.5 K/UL (ref 4–11)

## 2024-06-05 PROCEDURE — 6370000000 HC RX 637 (ALT 250 FOR IP)

## 2024-06-05 PROCEDURE — 94669 MECHANICAL CHEST WALL OSCILL: CPT

## 2024-06-05 PROCEDURE — 2580000003 HC RX 258: Performed by: ANESTHESIOLOGY

## 2024-06-05 PROCEDURE — 94640 AIRWAY INHALATION TREATMENT: CPT

## 2024-06-05 PROCEDURE — 2000000000 HC ICU R&B

## 2024-06-05 PROCEDURE — 80048 BASIC METABOLIC PNL TOTAL CA: CPT

## 2024-06-05 PROCEDURE — 85025 COMPLETE CBC W/AUTO DIFF WBC: CPT

## 2024-06-05 PROCEDURE — 6370000000 HC RX 637 (ALT 250 FOR IP): Performed by: STUDENT IN AN ORGANIZED HEALTH CARE EDUCATION/TRAINING PROGRAM

## 2024-06-05 PROCEDURE — 71045 X-RAY EXAM CHEST 1 VIEW: CPT

## 2024-06-05 RX ORDER — ATORVASTATIN CALCIUM 10 MG/1
10 TABLET, FILM COATED ORAL DAILY
Status: DISCONTINUED | OUTPATIENT
Start: 2024-06-05 | End: 2024-06-06 | Stop reason: HOSPADM

## 2024-06-05 RX ORDER — GABAPENTIN 100 MG/1
100 CAPSULE ORAL 3 TIMES DAILY
Status: DISCONTINUED | OUTPATIENT
Start: 2024-06-05 | End: 2024-06-06 | Stop reason: HOSPADM

## 2024-06-05 RX ADMIN — CHLORDIAZEPOXIDE HYDROCHLORIDE 25 MG: 25 CAPSULE ORAL at 10:12

## 2024-06-05 RX ADMIN — MUPIROCIN: 20 OINTMENT TOPICAL at 19:59

## 2024-06-05 RX ADMIN — SODIUM CHLORIDE, PRESERVATIVE FREE 10 ML: 5 INJECTION INTRAVENOUS at 10:13

## 2024-06-05 RX ADMIN — ATORVASTATIN CALCIUM 10 MG: 10 TABLET, FILM COATED ORAL at 10:12

## 2024-06-05 RX ADMIN — CHLORDIAZEPOXIDE HYDROCHLORIDE 25 MG: 25 CAPSULE ORAL at 16:14

## 2024-06-05 RX ADMIN — GABAPENTIN 100 MG: 100 CAPSULE ORAL at 19:58

## 2024-06-05 RX ADMIN — OXYCODONE HYDROCHLORIDE 5 MG: 5 TABLET ORAL at 07:51

## 2024-06-05 RX ADMIN — SODIUM CHLORIDE, PRESERVATIVE FREE 10 ML: 5 INJECTION INTRAVENOUS at 19:58

## 2024-06-05 RX ADMIN — OXYCODONE HYDROCHLORIDE 5 MG: 5 TABLET ORAL at 18:23

## 2024-06-05 RX ADMIN — CHLORDIAZEPOXIDE HYDROCHLORIDE 25 MG: 25 CAPSULE ORAL at 04:13

## 2024-06-05 RX ADMIN — GABAPENTIN 100 MG: 100 CAPSULE ORAL at 14:18

## 2024-06-05 RX ADMIN — MUPIROCIN: 20 OINTMENT TOPICAL at 10:13

## 2024-06-05 RX ADMIN — GABAPENTIN 100 MG: 100 CAPSULE ORAL at 10:12

## 2024-06-05 ASSESSMENT — PAIN DESCRIPTION - PAIN TYPE: TYPE: SURGICAL PAIN

## 2024-06-05 ASSESSMENT — PAIN DESCRIPTION - DESCRIPTORS: DESCRIPTORS: ACHING;DISCOMFORT

## 2024-06-05 ASSESSMENT — PAIN - FUNCTIONAL ASSESSMENT: PAIN_FUNCTIONAL_ASSESSMENT: PREVENTS OR INTERFERES SOME ACTIVE ACTIVITIES AND ADLS

## 2024-06-05 ASSESSMENT — PAIN SCALES - GENERAL
PAINLEVEL_OUTOF10: 0
PAINLEVEL_OUTOF10: 5
PAINLEVEL_OUTOF10: 0
PAINLEVEL_OUTOF10: 0

## 2024-06-05 ASSESSMENT — PAIN DESCRIPTION - ONSET: ONSET: ON-GOING

## 2024-06-05 ASSESSMENT — PAIN DESCRIPTION - FREQUENCY: FREQUENCY: CONTINUOUS

## 2024-06-05 ASSESSMENT — PAIN DESCRIPTION - ORIENTATION: ORIENTATION: RIGHT

## 2024-06-05 ASSESSMENT — PAIN DESCRIPTION - LOCATION: LOCATION: CHEST

## 2024-06-05 NOTE — PLAN OF CARE
Problem: Discharge Planning  Goal: Discharge to home or other facility with appropriate resources  6/5/2024 1055 by Carlos Mae RN  Outcome: Progressing  6/4/2024 2150 by Karan Snyder RN  Outcome: Progressing     Problem: Pain  Goal: Verbalizes/displays adequate comfort level or baseline comfort level  6/5/2024 1055 by Carlos Mae RN  Outcome: Progressing  6/4/2024 2150 by Karan Snyder RN  Outcome: Progressing     Problem: Safety - Adult  Goal: Free from fall injury  6/5/2024 1055 by Carlos Mae RN  Outcome: Progressing  6/4/2024 2150 by Karan Snyder RN  Outcome: Progressing     Problem: ABCDS Injury Assessment  Goal: Absence of physical injury  6/5/2024 1055 by Carlos Mae RN  Outcome: Progressing  6/4/2024 2150 by Karan Snyder RN  Outcome: Progressing

## 2024-06-05 NOTE — PLAN OF CARE
Problem: Discharge Planning  Goal: Discharge to home or other facility with appropriate resources  6/4/2024 2150 by Karan Snyder RN  Outcome: Progressing  6/4/2024 1759 by Angelita Tavares RN  Outcome: Progressing     Problem: Pain  Goal: Verbalizes/displays adequate comfort level or baseline comfort level  6/4/2024 2150 by Karan Snyder RN  Outcome: Progressing  6/4/2024 1759 by Angelita Tavares RN  Outcome: Progressing     Problem: Safety - Adult  Goal: Free from fall injury  6/4/2024 2150 by Karan Snyder RN  Outcome: Progressing  6/4/2024 1759 by Angelita Tavares RN  Outcome: Progressing     Problem: ABCDS Injury Assessment  Goal: Absence of physical injury  6/4/2024 2150 by Karan Snyder RN  Outcome: Progressing  6/4/2024 1759 by Angelita Tavares RN  Outcome: Progressing

## 2024-06-06 ENCOUNTER — APPOINTMENT (OUTPATIENT)
Dept: GENERAL RADIOLOGY | Age: 54
DRG: 165 | End: 2024-06-06
Attending: STUDENT IN AN ORGANIZED HEALTH CARE EDUCATION/TRAINING PROGRAM
Payer: COMMERCIAL

## 2024-06-06 VITALS
HEIGHT: 62 IN | RESPIRATION RATE: 16 BRPM | OXYGEN SATURATION: 99 % | TEMPERATURE: 98.3 F | SYSTOLIC BLOOD PRESSURE: 110 MMHG | HEART RATE: 66 BPM | WEIGHT: 113.1 LBS | BODY MASS INDEX: 20.81 KG/M2 | DIASTOLIC BLOOD PRESSURE: 74 MMHG

## 2024-06-06 LAB
ANION GAP SERPL CALCULATED.3IONS-SCNC: 8 MMOL/L (ref 3–16)
BASOPHILS # BLD: 0 K/UL (ref 0–0.2)
BASOPHILS NFR BLD: 0.2 %
BUN SERPL-MCNC: 13 MG/DL (ref 7–20)
CALCIUM SERPL-MCNC: 8.6 MG/DL (ref 8.3–10.6)
CHLORIDE SERPL-SCNC: 102 MMOL/L (ref 99–110)
CO2 SERPL-SCNC: 28 MMOL/L (ref 21–32)
CREAT SERPL-MCNC: <0.5 MG/DL (ref 0.6–1.1)
DEPRECATED RDW RBC AUTO: 13.7 % (ref 12.4–15.4)
EOSINOPHIL # BLD: 0.1 K/UL (ref 0–0.6)
EOSINOPHIL NFR BLD: 1 %
GFR SERPLBLD CREATININE-BSD FMLA CKD-EPI: >90 ML/MIN/{1.73_M2}
GLUCOSE SERPL-MCNC: 110 MG/DL (ref 70–99)
HCT VFR BLD AUTO: 32.1 % (ref 36–48)
HGB BLD-MCNC: 11.1 G/DL (ref 12–16)
LYMPHOCYTES # BLD: 1.6 K/UL (ref 1–5.1)
LYMPHOCYTES NFR BLD: 21.8 %
MAGNESIUM SERPL-MCNC: 1.7 MG/DL (ref 1.8–2.4)
MCH RBC QN AUTO: 35 PG (ref 26–34)
MCHC RBC AUTO-ENTMCNC: 34.5 G/DL (ref 31–36)
MCV RBC AUTO: 101.6 FL (ref 80–100)
MONOCYTES # BLD: 0.6 K/UL (ref 0–1.3)
MONOCYTES NFR BLD: 7.8 %
NEUTROPHILS # BLD: 5.2 K/UL (ref 1.7–7.7)
NEUTROPHILS NFR BLD: 69.2 %
PLATELET # BLD AUTO: 231 K/UL (ref 135–450)
PMV BLD AUTO: 7 FL (ref 5–10.5)
POTASSIUM SERPL-SCNC: 3.4 MMOL/L (ref 3.5–5.1)
RBC # BLD AUTO: 3.16 M/UL (ref 4–5.2)
REASON FOR REJECTION: NORMAL
REJECTED TEST: NORMAL
SODIUM SERPL-SCNC: 138 MMOL/L (ref 136–145)
WBC # BLD AUTO: 7.5 K/UL (ref 4–11)

## 2024-06-06 PROCEDURE — 2580000003 HC RX 258: Performed by: ANESTHESIOLOGY

## 2024-06-06 PROCEDURE — 6360000002 HC RX W HCPCS

## 2024-06-06 PROCEDURE — 6370000000 HC RX 637 (ALT 250 FOR IP)

## 2024-06-06 PROCEDURE — 94640 AIRWAY INHALATION TREATMENT: CPT

## 2024-06-06 PROCEDURE — 85025 COMPLETE CBC W/AUTO DIFF WBC: CPT

## 2024-06-06 PROCEDURE — 80048 BASIC METABOLIC PNL TOTAL CA: CPT

## 2024-06-06 PROCEDURE — 71045 X-RAY EXAM CHEST 1 VIEW: CPT

## 2024-06-06 PROCEDURE — 94669 MECHANICAL CHEST WALL OSCILL: CPT

## 2024-06-06 PROCEDURE — 36415 COLL VENOUS BLD VENIPUNCTURE: CPT

## 2024-06-06 PROCEDURE — 83735 ASSAY OF MAGNESIUM: CPT

## 2024-06-06 RX ORDER — POTASSIUM CHLORIDE 20 MEQ/1
60 TABLET, EXTENDED RELEASE ORAL ONCE
Status: COMPLETED | OUTPATIENT
Start: 2024-06-06 | End: 2024-06-06

## 2024-06-06 RX ORDER — CLONIDINE HYDROCHLORIDE 0.1 MG/1
0.1 TABLET ORAL 2 TIMES DAILY
Status: DISCONTINUED | OUTPATIENT
Start: 2024-06-06 | End: 2024-06-06 | Stop reason: HOSPADM

## 2024-06-06 RX ORDER — MAGNESIUM SULFATE IN WATER 40 MG/ML
2000 INJECTION, SOLUTION INTRAVENOUS ONCE
Status: COMPLETED | OUTPATIENT
Start: 2024-06-06 | End: 2024-06-06

## 2024-06-06 RX ORDER — CLONIDINE HYDROCHLORIDE 0.1 MG/1
0.1 TABLET ORAL 2 TIMES DAILY
COMMUNITY

## 2024-06-06 RX ORDER — GABAPENTIN 100 MG/1
100 CAPSULE ORAL 3 TIMES DAILY
Qty: 42 CAPSULE | Refills: 0 | Status: SHIPPED | OUTPATIENT
Start: 2024-06-06 | End: 2024-06-20

## 2024-06-06 RX ORDER — POTASSIUM CHLORIDE 29.8 MG/ML
20 INJECTION INTRAVENOUS
Status: DISCONTINUED | OUTPATIENT
Start: 2024-06-06 | End: 2024-06-06

## 2024-06-06 RX ORDER — CLONIDINE HYDROCHLORIDE 0.1 MG/1
0.5 TABLET ORAL 2 TIMES DAILY
Status: DISCONTINUED | OUTPATIENT
Start: 2024-06-06 | End: 2024-06-06

## 2024-06-06 RX ORDER — OXYCODONE HYDROCHLORIDE 5 MG/1
5 TABLET ORAL EVERY 6 HOURS PRN
Qty: 28 TABLET | Refills: 0 | Status: SHIPPED | OUTPATIENT
Start: 2024-06-06 | End: 2024-06-13

## 2024-06-06 RX ADMIN — POTASSIUM CHLORIDE 60 MEQ: 1500 TABLET, EXTENDED RELEASE ORAL at 08:22

## 2024-06-06 RX ADMIN — SODIUM CHLORIDE, PRESERVATIVE FREE 10 ML: 5 INJECTION INTRAVENOUS at 07:35

## 2024-06-06 RX ADMIN — CHLORDIAZEPOXIDE HYDROCHLORIDE 25 MG: 25 CAPSULE ORAL at 06:26

## 2024-06-06 RX ADMIN — MUPIROCIN: 20 OINTMENT TOPICAL at 07:36

## 2024-06-06 RX ADMIN — OXYCODONE HYDROCHLORIDE 5 MG: 5 TABLET ORAL at 06:26

## 2024-06-06 RX ADMIN — ATORVASTATIN CALCIUM 10 MG: 10 TABLET, FILM COATED ORAL at 07:35

## 2024-06-06 RX ADMIN — SODIUM CHLORIDE, PRESERVATIVE FREE 10 ML: 5 INJECTION INTRAVENOUS at 09:29

## 2024-06-06 RX ADMIN — CLONIDINE HYDROCHLORIDE 0.1 MG: 0.1 TABLET ORAL at 10:31

## 2024-06-06 RX ADMIN — MAGNESIUM SULFATE HEPTAHYDRATE 2000 MG: 40 INJECTION, SOLUTION INTRAVENOUS at 09:25

## 2024-06-06 RX ADMIN — GABAPENTIN 100 MG: 100 CAPSULE ORAL at 07:35

## 2024-06-06 RX ADMIN — CHLORDIAZEPOXIDE HYDROCHLORIDE 25 MG: 25 CAPSULE ORAL at 00:24

## 2024-06-06 RX ADMIN — MORPHINE SULFATE 2 MG: 2 INJECTION, SOLUTION INTRAMUSCULAR; INTRAVENOUS at 09:28

## 2024-06-06 RX ADMIN — GABAPENTIN 100 MG: 100 CAPSULE ORAL at 13:10

## 2024-06-06 RX ADMIN — CHLORDIAZEPOXIDE HYDROCHLORIDE 25 MG: 25 CAPSULE ORAL at 10:31

## 2024-06-06 ASSESSMENT — PAIN DESCRIPTION - ORIENTATION: ORIENTATION: RIGHT

## 2024-06-06 ASSESSMENT — PAIN DESCRIPTION - PAIN TYPE: TYPE: SURGICAL PAIN

## 2024-06-06 ASSESSMENT — PAIN DESCRIPTION - DESCRIPTORS: DESCRIPTORS: ACHING;DISCOMFORT

## 2024-06-06 ASSESSMENT — PAIN SCALES - GENERAL: PAINLEVEL_OUTOF10: 7

## 2024-06-06 ASSESSMENT — PAIN DESCRIPTION - FREQUENCY: FREQUENCY: CONTINUOUS

## 2024-06-06 ASSESSMENT — PAIN DESCRIPTION - LOCATION: LOCATION: CHEST

## 2024-06-06 NOTE — DISCHARGE INSTR - COC
Continuity of Care Form    Patient Name: Lisa Catalan   :  1970  MRN:  [id number]    Admit date:  2024  Discharge date:  ***    Code Status Order: Full Code   Advance Directives:   Advance Care Flowsheet Documentation       Date/Time Healthcare Directive Type of Healthcare Directive Copy in Chart Healthcare Agent Appointed Healthcare Agent's Name Healthcare Agent's Phone Number    24 1144 No, patient does not have an advance directive for healthcare treatment -- -- -- -- --            Admitting Physician:  Santo Herbert MD  PCP: Olive Jasmine MD    Discharging Nurse: ***  Discharging Hospital Unit/Room#: 0236/0236-01  Discharging Unit Phone Number: ***    Emergency Contact:   Extended Emergency Contact Information  Primary Emergency Contact: Dann Catalan  Mobile Phone: [phone]  Relation: Spouse  Preferred language: English   needed? No    Past Surgical History:  Past Surgical History:   Procedure Laterality Date    BREAST BIOPSY Right     BRONCHOSCOPY Right 2024    BRONCHOSCOPY ALVEOLAR LAVAGE ROBOTIC performed by Elvin Hernadez MD at [facility] ENDOSCOPY    BRONCHOSCOPY  2024    BRONCHOSCOPY ENDOBRONCHIAL ULTRASOUND FINE NEEDLE ASPIRATION ROBOTIC performed by Elvin Hernadez MD at [facility] ENDOSCOPY    BRONCHOSCOPY  2024    BRONCHOSCOPY BRUSHINGS ROBOTIC performed by Elvin Hernadez MD at [facility] ENDOSCOPY    BRONCHOSCOPY  2024    BRONCHOSCOPY/TRANSBRONCHIAL LUNG BIOPSY ROBOTIC performed by Elvin Hernadez MD at [facility] ENDOSCOPY    BUNIONECTOMY Left     CARDIAC PROCEDURE N/A 2024    Left heart cath / coronary angiography performed by Kumar Stovall MD at [facility] CARDIAC CATH LAB    LAPAROSCOPY      NECK MASS EXCISION      throat    THORACOSCOPY Right 2024    ROBOTIC LOBECTOMY RIGHT UPPER LOBE WITH MEDIASTINAL LYMPH NODE DISSECTION AND CRYOABLATION LEVELS 4 THORUGH 8 performed by Santo Herbert MD at [facility] OR    TUBAL LIGATION

## 2024-06-06 NOTE — PROGRESS NOTES
Nutrition Note    Nurse notified RD that pt is requesting heart healthy diet education. RD provided heart healthy nutrition handout and reviewed guidelines with pt. Discussed low sodium, whole grains, fruits and vegetables, lean proteins, healthier fat options, and meal planning tips. Pt reports good appetite/po intakes, reports previously drinking ensure clear at [facility] and would like to have here, RD to add. No further questions at time of visit. Will continue to monitor per nutrition standards of care.     Electronically signed by MS DALIA Cai on 6/5/24 at 11:43 AM EDT    Contact: Office: [phone]; [place]aden: [phone]      
  Surgery Date and Time: 6/4/24 @ 12:30 pm   Arrival Time: 10:30 am    The instructions given when and if a patient needs to stop oral intake prior to surgery varies. Follow the instructions you were given by your    Surgeon or RN during the Pre-op call.       __X__Nothing to eat or to drink after Midnight the night before the surgery. NO gum, mints, candy or ice chips day of surgery.                  Only take the following medications with a small sip of water the morning of surgery:  inhalers, clonidine       Aspirin, Ibuprofen, Advil, Naproxen, Vitamin E and other Anti-inflammatory products and supplements should be stopped for 5 -7days before surgery      or as directed by your physician.         - Do not smoke or vape, and do not drink any alcoholic beverages 24 hours prior to surgery, this includes NA Beer. Refrain from using any recreational drugs,     including non-prescribed prescription drugs.     -You may brush your teeth and gargle the morning of surgery.  DO NOT SWALLOW WATER.    -You MUST plan for a responsible adult to stay on site while you are here and take you home after your surgery. You will not be allowed to leave alone or drive               yourself home. It is requested someone stay with you the first 24 hrs. Your surgery will be cancelled if you do not have a ride home with a responsible adult.    -A parent/legal guardian must accompany a child scheduled for surgery and plan to stay at the hospital until the child is discharged.  Please do not bring other                children with you.    -Please wear simple, loose-fitting clothing to the hospital. Do not bring valuables (money, credit cards, checkbooks, etc.) Do not wear any makeup (including                no eye makeup) and no nail polish if applicable.             - DO NOT wear any jewelry or body piercings day of surgery.  All body piercing jewelry must be removed.             - If you have dentures they will be removed before going 
 will call pt with Home Health arrangements since pt is in a hurry to leave. Both pat & her son were informed of this. Pt discharged home via w/c to son's car with all instructions, belongings & meds from out pt pharmacy.  
CVTS Thoracic Progress Note:          CC:  Post op follow up. S/P Robotic RUL Lobectomy with mediastinal lymph node dissection and cryoablation.    Subj: Pt. doing well this morning, resting in bed.  Pain tolerated well.    Obj:    Blood pressure 118/67, pulse 64, temperature 97.9 °F (36.6 °C), temperature source Oral, resp. rate 16, height 1.575 m (5' 2\"), weight 45.9 kg (101 lb 3.1 oz), SpO2 96 %.    Alert, oriented   S1 S2 normal. NSR on monitor  Lungs CTAB   Abdomen soft, round. Active bowel sounds   R chest wall incision CDI with dressings.   UOP 1.4L; Cr <.5.   Chest tube with 150 ml of serosanguinous drainage since surgery, no airleak    Diagnostics:   CBC with Differential:    Lab Results   Component Value Date/Time    WBC 15.5 06/05/2024 07:35 AM    RBC 3.24 06/05/2024 07:35 AM    HGB 11.3 06/05/2024 07:35 AM    HCT 33.0 06/05/2024 07:35 AM     06/05/2024 07:35 AM    .9 06/05/2024 07:35 AM    MCH 34.8 06/05/2024 07:35 AM    MCHC 34.1 06/05/2024 07:35 AM    RDW 13.6 06/05/2024 07:35 AM    LYMPHOPCT 6.7 06/05/2024 07:35 AM    MONOPCT 5.3 06/05/2024 07:35 AM    EOSPCT 0.0 06/05/2024 07:35 AM    BASOPCT 0.3 06/05/2024 07:35 AM    MONOSABS 0.8 06/05/2024 07:35 AM    EOSABS 0.0 06/05/2024 07:35 AM    BASOSABS 0.0 06/05/2024 07:35 AM     CMP:    Lab Results   Component Value Date/Time     06/05/2024 07:35 AM    K 3.9 06/05/2024 07:35 AM    K 4.0 12/04/2022 01:45 PM    CL 98 06/05/2024 07:35 AM    CO2 25 06/05/2024 07:35 AM    BUN 7 06/05/2024 07:35 AM    CREATININE <0.5 06/05/2024 07:35 AM    AGRATIO 1.3 05/20/2024 10:46 AM    LABGLOM >90 06/05/2024 07:35 AM    LABGLOM >60 12/04/2022 01:45 PM    GLUCOSE 118 06/05/2024 07:35 AM    CALCIUM 9.0 06/05/2024 07:35 AM    BILITOT 0.6 05/20/2024 10:46 AM    ALKPHOS 126 05/20/2024 10:46 AM    AST 18 05/20/2024 10:46 AM    ALT 12 05/20/2024 10:46 AM         Xray Result (most recent):  XR CHEST PORTABLE 06/05/2024    Narrative  EXAMINATION:  ONE XRAY VIEW 
CVTS Thoracic Progress Note:          CC:  Post op follow up. S/P Robotic RUL Lobectomy with mediastinal lymph node dissection and cryoablation.    Subj: Pt. doing well this morning, resting in chair.  Pain tolerated well.  Ready to go home.    Obj:    Blood pressure (!) 127/90, pulse 69, temperature 97.8 °F (36.6 °C), temperature source Oral, resp. rate 16, height 1.575 m (5' 2\"), weight 51.3 kg (113 lb 1.5 oz), SpO2 100 %.    Alert, oriented   S1 S2 normal. NSR on monitor  Lungs CTAB   Abdomen soft, round. Active bowel sounds   R chest wall incision CDI with dressings.   UOP 6x occurences; Cr <.5.   Chest tube with 18ml of serosanguinous drainage past 24h, no airleak    Diagnostics:   CBC with Differential:    Lab Results   Component Value Date/Time    WBC 7.5 06/06/2024 04:34 AM    RBC 3.16 06/06/2024 04:34 AM    HGB 11.1 06/06/2024 04:34 AM    HCT 32.1 06/06/2024 04:34 AM     06/06/2024 04:34 AM    .6 06/06/2024 04:34 AM    MCH 35.0 06/06/2024 04:34 AM    MCHC 34.5 06/06/2024 04:34 AM    RDW 13.7 06/06/2024 04:34 AM    LYMPHOPCT 21.8 06/06/2024 04:34 AM    MONOPCT 7.8 06/06/2024 04:34 AM    EOSPCT 1.0 06/06/2024 04:34 AM    BASOPCT 0.2 06/06/2024 04:34 AM    MONOSABS 0.6 06/06/2024 04:34 AM    EOSABS 0.1 06/06/2024 04:34 AM    BASOSABS 0.0 06/06/2024 04:34 AM     CMP:    Lab Results   Component Value Date/Time     06/06/2024 04:34 AM    K 3.4 06/06/2024 04:34 AM    K 4.0 12/04/2022 01:45 PM     06/06/2024 04:34 AM    CO2 28 06/06/2024 04:34 AM    BUN 13 06/06/2024 04:34 AM    CREATININE <0.5 06/06/2024 04:34 AM    AGRATIO 1.3 05/20/2024 10:46 AM    LABGLOM >90 06/06/2024 04:34 AM    LABGLOM >60 12/04/2022 01:45 PM    GLUCOSE 110 06/06/2024 04:34 AM    CALCIUM 8.6 06/06/2024 04:34 AM    BILITOT 0.6 05/20/2024 10:46 AM    ALKPHOS 126 05/20/2024 10:46 AM    AST 18 05/20/2024 10:46 AM    ALT 12 05/20/2024 10:46 AM         Xray Result (most recent):  XR CHEST PORTABLE 
Care rounds completed with Dr. Choudhary-see new orders  
Chest tubes meet criteria to remove per open heart protocol. If chest tubes do not meet criteria, a specific order has been entered to remove.  0 air leak.  + crepitus prior to removal.  Pt instructed on procedure.  Pt premedicated with morphine  2 mg IVP.  Site cleansed and prepped per protocol.  Chest tubes X 1 removed without difficulty.  Dry sterile dressing and vaseline gauze applied.  Bilateral breath sounds audible.  O2Sats 97% on room air. Incision site within normal limits. Patient tolerated well.  Chest x-ray ordered.     
Port CXR done  
Pt got self dressed, removed telemetry. Discharge instructions explained to pt and her son including side effects to meds. Belongings packed by pt.  
Shift: [id number]    Admitting diagnosis: RUL adenocarcinoma    Presentation to hospital: for surgery    Surgery: yes -     Nursing assessment at handoff  stable    Emergency Contact/POA: Dann ()  Family updated: no    Most recent vitals: BP (!) 127/90   Pulse 75   Temp 98.1 °F (36.7 °C) (Oral)   Resp 16   Ht 1.575 m (5' 2\")   Wt 51.3 kg (113 lb 1.5 oz)   SpO2 94%   BMI 20.69 kg/m²      Rhythm: Normal Sinus Rhythm    NC/HFNC-  RA    Increased O2 requirements: No    Admission weight Weight - Scale: 48.1 kg (106 lb)  Today's weight   Wt Readings from Last 1 Encounters:   06/06/24 51.3 kg (113 lb 1.5 oz)         UOP >30ml/hr: yes -    Jimenez need assessed each shift: no    Restraints: no  Order current and documentation up to date?    Lines/Drains  LDA Insertion Date Discontinued Date Dressing Changes   PIV  6/4 x2     TLC       Arterial  6/4 6/5    Jimenez       Vas Cath      ETT       Surgical drains CTx1 6/4       Night Shift Hospitalist Interventions    Problem(Brief) Date Time Intervention Physician contacted                                               Drip rates at handoff:    sodium chloride         Hospital Course Daily Updates:  Admit Day 0 6/4/24  -s/p robotic right thoracoscopy, lobectomy RUL w/ lymph nodes removal    Admit Day 1 6/5/24  -A-line removed  -CT out: 30 ml   -no acute events during the night  -Oxy given for pain    Lab Data:   CBC:   Recent Labs     06/05/24  0735 06/06/24  0434   WBC 15.5* 7.5   HGB 11.3* 11.1*   HCT 33.0* 32.1*   .9* 101.6*    231     BMP:    Recent Labs     06/05/24  0735 06/06/24  0434   * 138   K 3.9 3.4*   CO2 25 28   BUN 7 13   CREATININE <0.5* <0.5*     LIVR: No results for input(s): \"AST\", \"ALT\" in the last 72 hours.  PT/INR: No results for input(s): \"PROT\", \"INR\" in the last 72 hours.  APTT: No results for input(s): \"APTT\" in the last 72 hours.  ABG: No results for input(s): \"PHART\", \"PZD5YPA\", \"PO2ART\" in the last 72 hours.  Consults 
DOS __________  (__) SED RATE  ___________  (__) OAC  _________________________  (__) C-REACTIVE PROTEIN ___________    (__) VITAMIN D HYDROXY ___________  (__) BETABLOCKER  _________________                                                                                       (__) ACE/ARBS:__________________________    (__) GLP-1 Agonist ___________________                Ride home/Contact #_______________________   (__) SCLT2 inhibitor ___________________

## 2024-06-06 NOTE — PLAN OF CARE
Problem: Discharge Planning  Goal: Discharge to home or other facility with appropriate resources  6/6/2024 0912 by Mckenzie Rodriges, RN  Outcome: Progressing  6/6/2024 0146 by Nolvia Irving, RN  Outcome: Progressing  Flowsheets (Taken 6/5/2024 2100)  Discharge to home or other facility with appropriate resources: Refer to discharge planning if patient needs post-hospital services based on physician order or complex needs related to functional status, cognitive ability or social support system

## 2024-06-06 NOTE — PLAN OF CARE
Problem: Discharge Planning  Goal: Discharge to home or other facility with appropriate resources  Outcome: Progressing  Flowsheets (Taken 6/5/2024 2100)  Discharge to home or other facility with appropriate resources: Refer to discharge planning if patient needs post-hospital services based on physician order or complex needs related to functional status, cognitive ability or social support system     Problem: Pain  Goal: Verbalizes/displays adequate comfort level or baseline comfort level  Outcome: Progressing     Problem: Safety - Adult  Goal: Free from fall injury  Outcome: Progressing

## 2024-06-06 NOTE — DISCHARGE SUMMARY
Cardiac, Vascular & Thoracic Surgery  Discharge Summary    Patient:  Lisa Catalan 1970 [id number]   Admission Date:  6/4/2024 10:56 AM  Discharge Date:      Principle Diagnosis:  Primary lung adenocarcinoma, right (HCC)    Secondary Diagnosis:  Principal Problem:    Primary lung adenocarcinoma, right (HCC)  Active Problems:    Adenocarcinoma of upper lobe of right lung (HCC)  Resolved Problems:    * No resolved hospital problems. *      Procedure:  6/4/24 (Dr Herbert) - ROBOTIC LOBECTOMY RIGHT UPPER LOBE WITH MEDIASTINAL LYMPH NODE DISSECTION AND CRYOABLATION LEVELS 4 THORUGH 8     History:  The patient is a 53 y.o. female, known to the service, with significant past medical history of HTN, HLD, RUL mass with adenocarcinoma, R breast tumor, SOB, emphysema, everyday smoker, ETOH who presents for Robotic right thoracoscopy, lobectomy right upper lobe with lypmh node dissection and cryoablation with Dr. Herbert.      Hospital Course:  The patient underwent robotic RUL lobectomy w/ mediastinal LND on 6/4/24 w/ Dr. Herbert .  Her post operative course was uncomplicated. Post pull CXR today prior to discharge w/o acute findings.  She was D/C home w/ [facility].  The patient was discharged on 6/6/24.     Discharged Condition: stable    Disposition:  home W/ [facility]    Discharge Medications:     Medication List        START taking these medications      oxyCODONE 5 MG immediate release tablet  Commonly known as: ROXICODONE  Take 1 tablet by mouth every 6 hours as needed for Pain for up to 7 days. Max Daily Amount: 20 mg  Notes to patient: Oxycodone (Oxeta*, Oxycontin, Roxicodone)  Use: Pain control  Side effects: drowsiness, dizziness, constipation, nausea, and vomiting             CHANGE how you take these medications      gabapentin 100 MG capsule  Commonly known as: NEURONTIN  Take 1 capsule by mouth 3 times daily for 14 days.  What changed:   medication strength  how much to take  additional instructions  Notes to patient:

## 2024-06-06 NOTE — DISCHARGE INSTRUCTIONS
DISCHARGE INSTRUCTION FOR    Thoracotomy/Video Assisted Thoracoscopy/Mediastinoscopy      Do you have the help you need at home?      ACTIVITY  As tolerated within restrictions  Breathing exercises 10 times every hour/cough and deep breathe  No driving for 4 weeks or while on narcotics (pain med)    RESTRICTIONS  No strenuous activity  No heavy lifting, pushing, pulling for 6 weeks anything > 10 lbs    DO NOT SMOKE  This is one of the most important actions you can take to improve your current and future health.   If you need help with this please let us know - there are medications and the [employer] that may help to make smoking cessation easier for you.    Wound Care  Wash incisions daily with antibacterial soap and water  Do NOT apply anything else to incisions (no lotions, powder, ointment, etc)  Use dressing on wound ONLY if drainage is present. Remove dressing daily to wash incision; leave uncovered when drainage stops.    What symptoms or health problems do you need to look out for after you leave the hospital? Call your physician if you have any of these symptoms:  Increased redness, drainage, swelling, tenderness of any incision  Pain not relieved with prescribed pain medication; shortness of breath; elevated temperature above 101 degrees.    CALL 911 AND GO TO THE NEAREST EMERGENCY ROOM FOR ANY EMERGENCY    MEDICATIONS  See attached medication sheet  Please call during business hours for refills      Follow up appointment with Dr. Herbert  on 6/18/24 @ 10:30 am

## 2024-06-07 NOTE — CARE COORDINATION
CASE MANAGEMENT DISCHARGE SUMMARY      Discharge to: Home-   CM has spent the last 40 min contacting the insurance co., [facility] ,to check on HHC benefits. [facility] denied. Cm will call the pt once a HHC  has been secured      New Durable Medical Equipment ordered/agency: NO    Transportation:    Family/car: yes   Confirmed discharge plan with:     Patient: yes     Family:  yes   Name: Contact number:     Facility/Agency, name:  [facility] faxed   Phone number for report to facility:      RN, name: Mckenzie    Note: Discharging nurse to complete [facility], reconcile [facility], and place final copy with patient's discharge packet. RN to ensure that written prescriptions for  Level II medications are sent with patient to the facility as per protocol.      Ginger Redmond RN    
( Late Entry) ON 6/7/24 11:29  DC follow-up    CM called the pt at  home to check on her post op since she does not have HHC. She said she is doing good. She denied any fevers or SOB. She has a BP cuff at home and is checking to see if her dtr has a pulse oximeter to check her sats. She was instructed to change her drsg daily until there is no drainage and it is starting to scab over then she can leave it open to air. She verbalized understanding and will call the office with any questions or changes.     Information confirmed with Herber Bowles, CNP  
CM has made  multiple calls to insurance and to the 3 [facility] agencies they recommended and no results.  1- [facility]- No no staffing, reluctant due to insurance  2- [facility]- OON  3- [facility] - only cover [place]/ [place]    Call placed to [facility] intake, left  requesting 1 elliott visit for nursing if not in our network.  
Called [facility] and left  about referral for HC  
Assistance needed at discharge: Other (Comment) (follow for needs)            Potential DME:    Patient expects to discharge to: House  Plan for transportation at discharge: Family    Financial    Payor: [employer] / Plan: [employer] BOON / Product Type: *No Product type* /     Does insurance require precert for SNF: Yes    Potential assistance Purchasing Medications:  (Maybe)  Meds-to-Beds request: Yes      [id number] [place], [place] - [address]  - P [phone] - F [phone]  [address] [address]  Phone: [phone] Fax: [phone]      Notes:    Factors facilitating achievement of predicted outcomes: Family support    Barriers to discharge: Has CT now    Additional Case Management Notes: Pt is S/P RUL Lobect. From home w spouse in 3 level home.  IPTA- no DME or services. Has CT- pt states plan will be to remove prior to DC. Pt requests referral to A dietician for advice on healthy diet post op- call placed- they will see pt today.  Unclear if HHC will be needed- tentative to [facility] for benefit check only at this point. CM following for DC needs.     The Plan for Transition of Care is related to the following treatment goals of Primary lung adenocarcinoma, right (HCC) [C34.91]  Adenocarcinoma of upper lobe of right lung (HCC) [C34.11]    IF APPLICABLE: The Patient and/or patient representative Lisa and her family were provided with a choice of provider and agrees with the discharge plan. Freedom of choice list with basic dialogue that supports the patient's individualized plan of care/goals and shares the quality data associated with the providers was provided to:  (na at present)   Patient Representative Name:       The Patient and/or Patient Representative Agree with the Discharge Plan?      Johanny Shaver RN  Case Management Department  Ph: [phone] Fax: [phone]

## 2024-06-08 ENCOUNTER — APPOINTMENT (OUTPATIENT)
Dept: GENERAL RADIOLOGY | Age: 54
End: 2024-06-08
Payer: COMMERCIAL

## 2024-06-08 ENCOUNTER — HOSPITAL ENCOUNTER (EMERGENCY)
Age: 54
Discharge: HOME | End: 2024-06-08
Attending: EMERGENCY MEDICINE
Payer: COMMERCIAL

## 2024-06-08 VITALS
RESPIRATION RATE: 15 BRPM | WEIGHT: 113 LBS | HEIGHT: 62 IN | DIASTOLIC BLOOD PRESSURE: 99 MMHG | TEMPERATURE: 98.1 F | SYSTOLIC BLOOD PRESSURE: 130 MMHG | BODY MASS INDEX: 20.8 KG/M2 | HEART RATE: 85 BPM | OXYGEN SATURATION: 92 %

## 2024-06-08 DIAGNOSIS — G89.18 ACUTE POST-OPERATIVE PAIN: Primary | ICD-10-CM
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
## 2024-06-08 DIAGNOSIS — Z48.89 ENCOUNTER FOR POSTOPERATIVE WOUND CHECK: ICD-10-CM

## 2024-06-08 PROCEDURE — 99283 EMERGENCY DEPT VISIT LOW MDM: CPT

## 2024-06-08 PROCEDURE — 71045 X-RAY EXAM CHEST 1 VIEW: CPT

## 2024-06-08 ASSESSMENT — PAIN DESCRIPTION - ORIENTATION: ORIENTATION: RIGHT

## 2024-06-08 ASSESSMENT — PAIN - FUNCTIONAL ASSESSMENT: PAIN_FUNCTIONAL_ASSESSMENT: 0-10

## 2024-06-08 ASSESSMENT — PAIN SCALES - GENERAL: PAINLEVEL_OUTOF10: 3

## 2024-06-08 NOTE — ED NOTES
Wound care completed. Nonadherent dressing applied, 4x4 in place, covered with a Tegaderm. Pt and family edu re:wound care at home. Pt verbalized understanding.
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
## 2024-06-08 NOTE — ED NOTES
Pt removed telemetry leads, BP cuff and SPO2. Pt came to RN station w/ family asking to leave. Per MD, ok for d/c. Gave pt paperwork. Pt did not want edu re:d/c. Pt left, ambulatory, w/ family and all personal belongings.

## 2024-06-15 PROBLEM — Z01.810 PREOP CARDIOVASCULAR EXAM: Status: RESOLVED | Noted: 2024-05-16 | Resolved: 2024-06-15

## 2024-07-01 ENCOUNTER — TELEPHONE (OUTPATIENT)
Dept: CARDIOLOGY CLINIC | Age: 54
End: 2024-07-01

## 2024-07-01 ENCOUNTER — TELEPHONE (OUTPATIENT)
Dept: PULMONOLOGY | Age: 54
End: 2024-07-01

## 2024-07-01 NOTE — TELEPHONE ENCOUNTER
Patient called to see if she needs to follow up with you?  She had her procedure with Dr Kaston   Callback # [phone]

## 2024-07-01 NOTE — TELEPHONE ENCOUNTER
Pt is very confused as to which doctors need to sign off on FMLA paperwork and which doctors need to be seen to be cleared to go back to work. Pt believes cardiology is one of them. Pt would like to discuss.    [phone]

## 2024-07-01 NOTE — TELEPHONE ENCOUNTER
Spoke with patient, saw Dr. Daniel for cardiac clearance, recommends she reach out to her PCP. She has an appointment tomorrow.

## 2024-07-02 ENCOUNTER — OFFICE VISIT (OUTPATIENT)
Dept: INTERNAL MEDICINE CLINIC | Age: 54
End: 2024-07-02
Payer: COMMERCIAL

## 2024-07-02 VITALS
WEIGHT: 108 LBS | BODY MASS INDEX: 19.88 KG/M2 | HEIGHT: 62 IN | SYSTOLIC BLOOD PRESSURE: 128 MMHG | DIASTOLIC BLOOD PRESSURE: 88 MMHG | HEART RATE: 64 BPM

## 2024-07-02 DIAGNOSIS — I10 PRIMARY HYPERTENSION: Primary | ICD-10-CM

## 2024-07-02 DIAGNOSIS — K21.9 GASTROESOPHAGEAL REFLUX DISEASE WITHOUT ESOPHAGITIS: ICD-10-CM

## 2024-07-02 PROCEDURE — 99214 OFFICE O/P EST MOD 30 MIN: CPT | Performed by: STUDENT IN AN ORGANIZED HEALTH CARE EDUCATION/TRAINING PROGRAM

## 2024-07-02 PROCEDURE — G2211 COMPLEX E/M VISIT ADD ON: HCPCS | Performed by: STUDENT IN AN ORGANIZED HEALTH CARE EDUCATION/TRAINING PROGRAM

## 2024-07-02 PROCEDURE — 3074F SYST BP LT 130 MM HG: CPT | Performed by: STUDENT IN AN ORGANIZED HEALTH CARE EDUCATION/TRAINING PROGRAM

## 2024-07-02 PROCEDURE — 3079F DIAST BP 80-89 MM HG: CPT | Performed by: STUDENT IN AN ORGANIZED HEALTH CARE EDUCATION/TRAINING PROGRAM

## 2024-07-02 ASSESSMENT — ENCOUNTER SYMPTOMS
EYE PAIN: 0
DIARRHEA: 0
SHORTNESS OF BREATH: 0
SINUS PAIN: 0
VOMITING: 0
CONSTIPATION: 0
ABDOMINAL PAIN: 0

## 2024-07-02 NOTE — ASSESSMENT & PLAN NOTE
This is a chronic problem with recent worsening.  Patient has not been taking Nexium regularly.  She is been taking Rolaids which helped.  She says she has been eating a lot of tomatoes lately.  - Recommended avoiding acidic foods which are known to exacerbate GERD  - Continue Nexium  - Can try Pepcid as well if needed

## 2024-07-02 NOTE — PROGRESS NOTES
2024    Lisa Catalan (:  1970) is a 53 y.o. female, here for evaluation of the following medical concerns:    Chief Complaint   Patient presents with    Follow-up     Discuss FMLA/Short term disability paperwork         HPI    Patient is here today to establish care. She underwent resection of lung adenocarcinoma 24. She has had post-op pain.  She was previously following with oncology, however given that there are no further plans for active treatment, she will be following with pulmonology (Dr. Connor).    Patient has had heartburn intermittently. She tries rolaid tablets which help. Nexium is not helping, however she has not been taking it consistently.     Review of Systems   Constitutional:  Negative for fatigue.   HENT:  Negative for hearing loss and sinus pain.    Eyes:  Negative for pain.   Respiratory:  Negative for shortness of breath.    Cardiovascular:  Positive for chest pain. Negative for palpitations.   Gastrointestinal:  Negative for abdominal pain, constipation, diarrhea and vomiting.   Genitourinary:  Negative for difficulty urinating and vaginal bleeding.   Musculoskeletal:  Negative for myalgias.   Neurological:  Negative for dizziness and weakness.   Psychiatric/Behavioral:  Negative for dysphoric mood.        Prior to Visit Medications    Medication Sig Taking? Authorizing Provider   cloNIDine (CATAPRES) 0.1 MG tablet Take 1 tablet by mouth 2 times daily Yes Ronny Orellana MD   gabapentin (NEURONTIN) 100 MG capsule Take 1 capsule by mouth 3 times daily for 14 days. Yes Herber Bowles APRN - CNP   Simethicone (GAS-X PO) Take by mouth as needed (gas) Yes Ronny Orellana MD   Budeson-Glycopyrrol-Formoterol 160-9-4.8 MCG/ACT AERO Inhale 2 puffs into the lungs in the morning and at bedtime Yes Carter Connor MD   albuterol sulfate HFA (PROVENTIL HFA) 108 (90 Base) MCG/ACT inhaler Inhale 2 puffs into the lungs every 4 hours as needed for Wheezing or Shortness of

## 2024-07-03 ENCOUNTER — TELEPHONE (OUTPATIENT)
Dept: PULMONOLOGY | Age: 54
End: 2024-07-03

## 2024-07-03 NOTE — TELEPHONE ENCOUNTER
Patient stopped in the office with FMLA papers. She did not know if Dr Connor should fill them out or Dr Santo Herbert MD since he did the surgery. I told pt if she has missed work from the surgery then Dr Herbert would fill them out because Dr Connor will be following her for COPD. I made a copy of the forms. So if Dr Connor needs to fill them out they are on my desk.  Patient was told to contact Dr Herbert's office to ask him as well. She had not been there to ask him yet.

## 2024-07-15 NOTE — TELEPHONE ENCOUNTER
I spoke with the pt today and she asked that Dr Connor complete FMLA papers. She will also need a note to go back to work on Monday 7/22/2024 with restrictions, unable to push food cart due to sob with exertion. She would like this restriction until she sees Dr Connor in October & is reevaluated.   I told the pt that Dr Connor will be in the office on Wednesday 7/17/2024 & if he ok's the letter I will have him sign forms and letter at that time.    Is it ok to write up a letter for your signature with that restriction?

## 2024-10-10 ENCOUNTER — OFFICE VISIT (OUTPATIENT)
Dept: PULMONOLOGY | Age: 54
End: 2024-10-10
Payer: COMMERCIAL

## 2024-10-10 VITALS
BODY MASS INDEX: 19.32 KG/M2 | WEIGHT: 105 LBS | HEIGHT: 62 IN | DIASTOLIC BLOOD PRESSURE: 84 MMHG | OXYGEN SATURATION: 92 % | HEART RATE: 70 BPM | SYSTOLIC BLOOD PRESSURE: 130 MMHG | RESPIRATION RATE: 16 BRPM

## 2024-10-10 DIAGNOSIS — Z72.0 TOBACCO USE: ICD-10-CM

## 2024-10-10 DIAGNOSIS — C34.91 PRIMARY LUNG ADENOCARCINOMA, RIGHT (HCC): Primary | ICD-10-CM

## 2024-10-10 DIAGNOSIS — J44.9 COPD, MILD (HCC): ICD-10-CM

## 2024-10-10 PROCEDURE — 3079F DIAST BP 80-89 MM HG: CPT | Performed by: INTERNAL MEDICINE

## 2024-10-10 PROCEDURE — 99214 OFFICE O/P EST MOD 30 MIN: CPT | Performed by: INTERNAL MEDICINE

## 2024-10-10 PROCEDURE — 3075F SYST BP GE 130 - 139MM HG: CPT | Performed by: INTERNAL MEDICINE

## 2024-10-10 NOTE — PROGRESS NOTES
[facility] Pulmonary and Critical Care    Outpatient Follow Up Note    Subjective:   CHIEF COMPLAINT / HPI:     The patient is 54 y.o. female here for follow-up of COPD, tobacco use, and stage I primary lung adenocarcinoma status post right upper lobe lobectomy in June 2024.  She continues on Breztri and notes that she will get dyspneic if she moves too quickly so she does take her time now.  She has no cough, wheezing, or chest tightness.  She continues to smoke 1 pack of cigarettes a day.      4/23/2024  Lisa is here to discuss pathology results from her Ion navigational bronchoscopy at [facility] on Friday.  Procedure went well and she had no complications.    She continues to have some dyspnea with moderate exertion such as pushing heavy meal tray at work.  She is on Breztri but is coming to the end of the samples her PCP gave her.  She occasionally has wheezing but no cough, chest tightness, or hemoptysis.  She does not have a rescue albuterol inhaler.  She has tried the amount that she smokes.     3/25/2024  Lisa presents today for a new patient visit for abnormal lung cancer screening CT chest.  She has approximately 40-pack-year history of tobacco.  She complains of some dyspnea on exertion however it is partially improved with Breztri although her compliance is spotty.  She has night sweats that she attributes to menopause but no fevers, chills, hemoptysis, anorexia, or weight loss.    Past Medical History:    Past Medical History:   Diagnosis Date    Emphysema lung (HCC)     Granular cell tumor 2019    R breast    HTN (hypertension)     Hyperlipidemia     Low back pain     Mass of right lung     Primary lung adenocarcinoma, right (HCC)     SOB (shortness of breath)     Tobacco use        Social History:      Currently smokes 3/4 pack of cigarettes a day. 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    Family History:  Asthma  Colon cancer  -

## 2024-11-20 ENCOUNTER — HOSPITAL ENCOUNTER (OUTPATIENT)
Dept: CT IMAGING | Age: 54
Discharge: HOME | End: 2024-11-20
Attending: INTERNAL MEDICINE
Payer: COMMERCIAL

## 2024-11-20 DIAGNOSIS — C34.91 PRIMARY LUNG ADENOCARCINOMA, RIGHT (HCC): ICD-10-CM

## 2024-11-20 PROCEDURE — 71250 CT THORAX DX C-: CPT

## 2025-04-10 ENCOUNTER — OFFICE VISIT (OUTPATIENT)
Dept: PULMONOLOGY | Age: 55
End: 2025-04-10
Payer: COMMERCIAL

## 2025-04-10 VITALS
RESPIRATION RATE: 16 BRPM | SYSTOLIC BLOOD PRESSURE: 110 MMHG | BODY MASS INDEX: 18.58 KG/M2 | OXYGEN SATURATION: 95 % | HEART RATE: 75 BPM | DIASTOLIC BLOOD PRESSURE: 62 MMHG | WEIGHT: 101 LBS | HEIGHT: 62 IN

## 2025-04-10 DIAGNOSIS — Z72.0 TOBACCO USE: ICD-10-CM

## 2025-04-10 DIAGNOSIS — J44.9 COPD, MILD (HCC): ICD-10-CM

## 2025-04-10 DIAGNOSIS — C34.91 PRIMARY LUNG ADENOCARCINOMA, RIGHT (HCC): Primary | ICD-10-CM

## 2025-04-10 PROCEDURE — 3074F SYST BP LT 130 MM HG: CPT | Performed by: INTERNAL MEDICINE

## 2025-04-10 PROCEDURE — G2211 COMPLEX E/M VISIT ADD ON: HCPCS | Performed by: INTERNAL MEDICINE

## 2025-04-10 PROCEDURE — 99214 OFFICE O/P EST MOD 30 MIN: CPT | Performed by: INTERNAL MEDICINE

## 2025-04-10 PROCEDURE — 3078F DIAST BP <80 MM HG: CPT | Performed by: INTERNAL MEDICINE

## 2025-04-10 NOTE — PROGRESS NOTES
[facility] Pulmonary and Critical Care    Outpatient Follow Up Note    Subjective:   CHIEF COMPLAINT / HPI:     The patient is 54 y.o. female here for follow-up of COPD, tobacco use, and stage I primary lung adenocarcinoma status post right upper lobe lobectomy in June 2024, COPD and tobacco use.  She continues to get dyspneic with exertion but it is stable.  If she slows down with her activities and paces herself she does better.  She complains of a little bit of right sided discomfort which is getting better and likely residual from her lobectomy.  No cough, wheezing, or chest tightness.  She is currently smoking half a pack a cigarettes a day but previously was doing better and down to a quarter but she had some life stressors that increased her smoking    10/10/2024  Lisa is here for follow-up of COPD, tobacco use, and stage I primary lung adenocarcinoma status post right upper lobe lobectomy in June 2024.  She continues on Breztri and notes that she will get dyspneic if she moves too quickly so she does take her time now.  She has no cough, wheezing, or chest tightness.  She continues to smoke 1 pack of cigarettes a day.      4/23/2024  Lisa is here to discuss pathology results from her Ion navigational bronchoscopy at [facility] on Friday.  Procedure went well and she had no complications.    She continues to have some dyspnea with moderate exertion such as pushing heavy meal tray at work.  She is on Breztri but is coming to the end of the samples her PCP gave her.  She occasionally has wheezing but no cough, chest tightness, or hemoptysis.  She does not have a rescue albuterol inhaler.  She has tried the amount that she smokes.     3/25/2024  Lisa presents today for a new patient visit for abnormal lung cancer screening CT chest.  She has approximately 40-pack-year history of tobacco.  She complains of some dyspnea on exertion however it is partially improved with Breztri although her

## 2025-04-25 ENCOUNTER — HOSPITAL ENCOUNTER (OUTPATIENT)
Dept: CT IMAGING | Age: 55
Discharge: HOME | End: 2025-04-25
Attending: INTERNAL MEDICINE
Payer: COMMERCIAL

## 2025-04-25 DIAGNOSIS — C34.91 PRIMARY LUNG ADENOCARCINOMA, RIGHT (HCC): ICD-10-CM

## 2025-04-25 PROCEDURE — 71250 CT THORAX DX C-: CPT

## 2025-04-28 RX ORDER — BUDESONIDE, GLYCOPYRROLATE, AND FORMOTEROL FUMARATE 160; 9; 4.8 UG/1; UG/1; UG/1
AEROSOL, METERED RESPIRATORY (INHALATION)
Qty: 10.7 G | Refills: 5 | Status: SHIPPED | OUTPATIENT
Start: 2025-04-28

## 2025-04-30 ENCOUNTER — TELEPHONE (OUTPATIENT)
Dept: PULMONOLOGY | Age: 55
End: 2025-04-30

## 2025-04-30 DIAGNOSIS — C34.91 PRIMARY LUNG ADENOCARCINOMA, RIGHT (HCC): Primary | ICD-10-CM

## 2025-04-30 NOTE — TELEPHONE ENCOUNTER
I called Lisa to discuss her 6-month surveillance CT chest for lung cancer.  There is a small stable left upper lobe pulmonary nodule that is unchanged.  There is no evidence of recurrence of her right upper lobe adenocarcinoma    Will repeat CT chest end of October 2025  I will place order for that  She is aware    Carter Connor MD  Pulmonary and Critical Care Medicine

## 2025-07-03 ENCOUNTER — OFFICE VISIT (OUTPATIENT)
Dept: FAMILY MEDICINE CLINIC | Age: 55
End: 2025-07-03
Payer: COMMERCIAL

## 2025-07-03 VITALS
TEMPERATURE: 98.5 F | BODY MASS INDEX: 18.03 KG/M2 | WEIGHT: 98 LBS | HEART RATE: 87 BPM | SYSTOLIC BLOOD PRESSURE: 102 MMHG | OXYGEN SATURATION: 99 % | HEIGHT: 62 IN | DIASTOLIC BLOOD PRESSURE: 64 MMHG

## 2025-07-03 DIAGNOSIS — I10 PRIMARY HYPERTENSION: ICD-10-CM

## 2025-07-03 DIAGNOSIS — A09 INFECTIOUS DIARRHEA: Primary | ICD-10-CM

## 2025-07-03 DIAGNOSIS — A09 INFECTIOUS DIARRHEA: ICD-10-CM

## 2025-07-03 DIAGNOSIS — R06.02 SOB (SHORTNESS OF BREATH): Primary | ICD-10-CM

## 2025-07-03 LAB
C DIFF TOX A+B STL QL IA: NORMAL
HEMOCCULT SP2 STL QL: NORMAL
HEMOCCULT SP3 STL QL: NORMAL
HEMOCCULT STL QL: NORMAL

## 2025-07-03 PROCEDURE — 3078F DIAST BP <80 MM HG: CPT | Performed by: NURSE PRACTITIONER

## 2025-07-03 PROCEDURE — G2211 COMPLEX E/M VISIT ADD ON: HCPCS | Performed by: NURSE PRACTITIONER

## 2025-07-03 PROCEDURE — 99214 OFFICE O/P EST MOD 30 MIN: CPT | Performed by: NURSE PRACTITIONER

## 2025-07-03 PROCEDURE — 3074F SYST BP LT 130 MM HG: CPT | Performed by: NURSE PRACTITIONER

## 2025-07-03 RX ORDER — AZITHROMYCIN 500 MG/1
500 TABLET, FILM COATED ORAL DAILY
Qty: 1 PACKET | Refills: 0 | Status: SHIPPED | OUTPATIENT
Start: 2025-07-03 | End: 2025-07-06

## 2025-07-03 RX ORDER — ALBUTEROL SULFATE 90 UG/1
2 INHALANT RESPIRATORY (INHALATION) EVERY 4 HOURS PRN
Qty: 8.5 G | Refills: 11 | Status: SHIPPED | OUTPATIENT
Start: 2025-07-03 | End: 2026-07-03

## 2025-07-03 SDOH — ECONOMIC STABILITY: FOOD INSECURITY: WITHIN THE PAST 12 MONTHS, YOU WORRIED THAT YOUR FOOD WOULD RUN OUT BEFORE YOU GOT MONEY TO BUY MORE.: PATIENT DECLINED

## 2025-07-03 SDOH — ECONOMIC STABILITY: FOOD INSECURITY: WITHIN THE PAST 12 MONTHS, THE FOOD YOU BOUGHT JUST DIDN'T LAST AND YOU DIDN'T HAVE MONEY TO GET MORE.: PATIENT DECLINED

## 2025-07-03 ASSESSMENT — PATIENT HEALTH QUESTIONNAIRE - PHQ9
SUM OF ALL RESPONSES TO PHQ QUESTIONS 1-9: 0
2. FEELING DOWN, DEPRESSED OR HOPELESS: NOT AT ALL
SUM OF ALL RESPONSES TO PHQ QUESTIONS 1-9: 0
DEPRESSION UNABLE TO ASSESS: FUNCTIONAL CAPACITY MOTIVATION LIMITS ACCURACY
1. LITTLE INTEREST OR PLEASURE IN DOING THINGS: NOT AT ALL

## 2025-07-03 ASSESSMENT — ENCOUNTER SYMPTOMS
ABDOMINAL DISTENTION: 0
BLOOD IN STOOL: 0
DIARRHEA: 1
NAUSEA: 1
VOMITING: 1
SINUS PAIN: 0
COUGH: 0
ABDOMINAL PAIN: 0
SINUS PRESSURE: 0
CONSTIPATION: 0
SHORTNESS OF BREATH: 0
BACK PAIN: 0
COLOR CHANGE: 0
WHEEZING: 0
RECTAL PAIN: 0

## 2025-07-03 NOTE — PROGRESS NOTES
Cardiovascular:  Negative for chest pain and palpitations.   Gastrointestinal:  Positive for diarrhea, nausea and vomiting. Negative for abdominal distention, abdominal pain, blood in stool, constipation and rectal pain.   Musculoskeletal:  Negative for arthralgias, back pain and myalgias.   Skin:  Negative for color change, pallor and rash.   Neurological:  Negative for dizziness, syncope, weakness, light-headedness and headaches.   Psychiatric/Behavioral:  Negative for behavioral problems, confusion and sleep disturbance. The patient is not nervous/anxious.        Vitals:    07/03/25 0734   BP: 102/64   BP Site: Right Upper Arm   Patient Position: Sitting   BP Cuff Size: Medium Adult   Pulse: 87   Temp: 98.5 °F (36.9 °C)   SpO2: 99%   Weight: 44.5 kg (98 lb)   Height: 1.575 m (5' 2\")       Physical Exam  Constitutional:       Appearance: Normal appearance.   HENT:      Head: Normocephalic and atraumatic.   Eyes:      Extraocular Movements: Extraocular movements intact.   Pulmonary:      Effort: Pulmonary effort is normal.   Abdominal:      General: Abdomen is flat. Bowel sounds are increased.      Palpations: Abdomen is soft.      Tenderness: There is no abdominal tenderness.   Musculoskeletal:      Cervical back: Normal range of motion.   Skin:     Coloration: Skin is not jaundiced or pale.   Neurological:      General: No focal deficit present.      Mental Status: She is alert and oriented to person, place, and time.   Psychiatric:         Mood and Affect: Mood normal.         Behavior: Behavior normal.         Thought Content: Thought content normal.           The patient (or guardian, if applicable) and other individuals in attendance with the patient were advised that Artificial Intelligence will be utilized during this visit to record, process the conversation to generate a clinical note, and support improvement of the AI technology. The patient (or guardian, if applicable) and other individuals in

## 2025-07-04 LAB
BASOPHILS # BLD: 0 K/UL (ref 0–0.2)
BASOPHILS NFR BLD: 0 %
DEPRECATED RDW RBC AUTO: 14.1 % (ref 12.4–15.4)
EOSINOPHIL # BLD: 0 K/UL (ref 0–0.6)
EOSINOPHIL NFR BLD: 0 %
GI PATHOGENS PNL STL NAA+PROBE: ABNORMAL
HCT VFR BLD AUTO: 37.4 % (ref 36–48)
HGB BLD-MCNC: 13.1 G/DL (ref 12–16)
LYMPHOCYTES # BLD: 1.1 K/UL (ref 1–5.1)
LYMPHOCYTES NFR BLD: 25 %
MACROCYTES BLD QL SMEAR: ABNORMAL
MCH RBC QN AUTO: 35.3 PG (ref 26–34)
MCHC RBC AUTO-ENTMCNC: 35 G/DL (ref 31–36)
MCV RBC AUTO: 100.9 FL (ref 80–100)
MONOCYTES # BLD: 0.8 K/UL (ref 0–1.3)
MONOCYTES NFR BLD: 19 %
NEUTROPHILS # BLD: 2.4 K/UL (ref 1.7–7.7)
NEUTROPHILS NFR BLD: 56 %
ORGANISM: ABNORMAL
PLATELET # BLD AUTO: 170 K/UL (ref 135–450)
PMV BLD AUTO: 8.6 FL (ref 5–10.5)
RBC # BLD AUTO: 3.71 M/UL (ref 4–5.2)
WBC # BLD AUTO: 4.3 K/UL (ref 4–11)

## 2025-07-08 ENCOUNTER — RESULTS FOLLOW-UP (OUTPATIENT)
Dept: FAMILY MEDICINE CLINIC | Age: 55
End: 2025-07-08

## (undated) DEVICE — SYRINGE BLB 50CC IRRIG PLIABLE FNGR FLNG GRAD FLSK DISP

## (undated) DEVICE — ADAPTER,CATHETER/SYRINGE/LUER,STERILE: Brand: MEDLINE

## (undated) DEVICE — COLUMN DRAPE

## (undated) DEVICE — SUREFORM 45 RELOAD GREEN: Brand: SUREFORM

## (undated) DEVICE — REDUCER: Brand: ENDOWRIST

## (undated) DEVICE — MEDI-VAC NON-CONDUCTIVE SUCTION TUBING: Brand: CARDINAL HEALTH

## (undated) DEVICE — LIQUIBAND RAPID ADHESIVE 36/CS 0.8ML: Brand: MEDLINE

## (undated) DEVICE — ADAPTER TBNG DIA15MM SWVL FBROPT BRONCHSCP TERM 2 AXIS PEEP

## (undated) DEVICE — COVER US PRB W12XL244CM SURGICAL INTRAOPERATIVE PLAS TAPR L

## (undated) DEVICE — SYRINGE, LUER SLIP, STERILE, 60ML: Brand: MEDLINE

## (undated) DEVICE — SYRINGE MED 10ML LUERLOCK TIP W/O SFTY DISP

## (undated) DEVICE — GOWN SIRUS NONREIN XL W/TWL: Brand: MEDLINE INDUSTRIES, INC.

## (undated) DEVICE — BRONCHOSCOPE CYTOLOGY BRUSH: Brand: COOK

## (undated) DEVICE — STAPLER 30 RELOAD WHITE: Brand: ENDOWRIST

## (undated) DEVICE — SUREFORM 45: Brand: SUREFORM

## (undated) DEVICE — SYRINGE MED 50ML LUERLOCK TIP

## (undated) DEVICE — SINGLE USE SUCTION VALVE MAJ-209: Brand: SINGLE USE SUCTION VALVE (STERILE)

## (undated) DEVICE — GLIDESHEATH SLENDER STAINLESS STEEL KIT: Brand: GLIDESHEATH SLENDER

## (undated) DEVICE — PROBE CRYOSURGERY 18IN BALL TIP 8MM ERGO HNDL BEND DST SHFT

## (undated) DEVICE — TIP-UP FENESTRATED GRASPER: Brand: ENDOWRIST

## (undated) DEVICE — BASIC SINGLE BASIN 1-LF: Brand: MEDLINE INDUSTRIES, INC.

## (undated) DEVICE — LUBE FOAM PAD ELECTRO ANTI STICK

## (undated) DEVICE — ARM DRAPE

## (undated) DEVICE — SEAL

## (undated) DEVICE — BIOPSY NEEDLE, 21G: Brand: FLEXISION

## (undated) DEVICE — CONNECTOR PERF W0.25XH3/8IN BASE Y SHP REDUC W/O LUERLOCK

## (undated) DEVICE — Device

## (undated) DEVICE — KIT ART LN 20GA L12CM FEP RADPQ 0.025X13.75IN SPR GWIRE

## (undated) DEVICE — SUTURE PERMA-HAND SZ 0 L18IN NONABSORBABLE BLK L30MM FSL 678G

## (undated) DEVICE — BLADE ES ELASTOMERIC COAT INSUL DURABLE BEND UPTO 90DEG

## (undated) DEVICE — TRAP,MUCUS SPECIMEN, 80CC: Brand: MEDLINE

## (undated) DEVICE — SWIVEL CONNECTOR

## (undated) DEVICE — SUTURE NONABSORBABLE MONOFILAMENT 4-0 RB-1 36 IN BLU PROLENE 8557H

## (undated) DEVICE — SUTURE VICRYL + SZ 3-0 L36IN ABSRB UD L36MM CT-1 1/2 CIR VCP944H

## (undated) DEVICE — COVER,TABLE,44X90,STERILE: Brand: MEDLINE

## (undated) DEVICE — KIT PRECLEANING BS ENDOSCP

## (undated) DEVICE — CO2 CANN,MICROFILTER,ADULT,14',NASAL: Brand: MEDLINE

## (undated) DEVICE — 260 CM J TIP WIRE .035

## (undated) DEVICE — SHORT WHOLEY WIRE 145CM

## (undated) DEVICE — ELECTRODE,ECG,STRESS,FOAM,3PK: Brand: MEDLINE

## (undated) DEVICE — TROCARS: Brand: KII® OPTICAL ACCESS SYSTEM

## (undated) DEVICE — 3M™ IOBAN™ 2 ANTIMICROBIAL INCISE DRAPE 6650EZ: Brand: IOBAN™ 2

## (undated) DEVICE — KIT COLON W/ 1.1OZ LUB AND 2 END

## (undated) DEVICE — FORCEPS BX L115CM ALGTR JAW STP DISP

## (undated) DEVICE — CATHETER 5FR JR4 MEDTRONIC 100CM

## (undated) DEVICE — SINGLE USE BIOPSY VALVE MAJ-210: Brand: SINGLE USE BIOPSY VALVE (STERILE)

## (undated) DEVICE — CADIERE FORCEPS: Brand: ENDOWRIST

## (undated) DEVICE — CATH LAB PACK: Brand: MEDLINE INDUSTRIES, INC.

## (undated) DEVICE — YANKAUER,BULB TIP,W/O VENT,RIGID,STERILE: Brand: MEDLINE

## (undated) DEVICE — 1LYRTR 16FR10ML100%SILTMPS SNP: Brand: MEDLINE INDUSTRIES, INC.

## (undated) DEVICE — SUTURE VICRYL SZ 0 L36IN ABSRB UD CT-1 L36MM 1/2 CIR TAPR PNT VCP946H

## (undated) DEVICE — CONMED SCOPE SAVER BITE BLOCK, 20X27 MM: Brand: SCOPE SAVER

## (undated) DEVICE — YANKAUER,OPEN TIP,W/O VENT,STERILE: Brand: MEDLINE INDUSTRIES, INC.

## (undated) DEVICE — STAPLER 30 RELOAD GREEN: Brand: ENDOWRIST

## (undated) DEVICE — CATHETER THOR 28FR SIL 6 EYE STR HI TEAR STRENGTH

## (undated) DEVICE — GOWN ISOLATN REGULAR L BLU POLYPR POLY OVR HD NK OPN BK

## (undated) DEVICE — VISION PROBE ADAPTER AND SUCTION ADAPTER

## (undated) DEVICE — TUBING, SUCTION, 3/16" X 12', STRAIGHT: Brand: MEDLINE

## (undated) DEVICE — SUTURE MONOCRYL + SZ 4-0 L18IN ABSRB UD L19MM PS-2 3/8 CIR MCP496G

## (undated) DEVICE — CURVED BIPOLAR DISSECTOR: Brand: ENDOWRIST

## (undated) DEVICE — BAG RETRIEVAL SPECIMEN SUPERBAG 15 2XL NYLON ITRODUCER

## (undated) DEVICE — PAD, DEFIB, ADULT, RADIOTRANS, PHYSIO: Brand: MEDLINE

## (undated) DEVICE — Device: Brand: BALLOON

## (undated) DEVICE — 60 ML SYRINGE REGULAR TIP: Brand: MONOJECT

## (undated) DEVICE — ELECTRODE LAP L36CM PTFE WIRE J HK CLEANCOAT

## (undated) DEVICE — GAUZE,SPONGE,4"X4",8PLY,STRL,LF,10/TRAY: Brand: MEDLINE

## (undated) DEVICE — SUTURE ETHIBOND EXCEL SZ 5 L30IN NONABSORBABLE GRN L40MM V-37 MB66G

## (undated) DEVICE — CATHETER DIAG 5FR L100CM LUMN ID0.047IN JL3.5 CRV 0 SIDE H

## (undated) DEVICE — TUBING, SUCTION, 3/16" X 6', STRAIGHT: Brand: MEDLINE

## (undated) DEVICE — PENCIL ES CRD L10FT HND SWCHING ROCK SWCH W/ EDGE COAT BLDE

## (undated) DEVICE — BOWL MED M 16OZ PLAS CAP GRAD